# Patient Record
Sex: FEMALE | Race: WHITE | URBAN - METROPOLITAN AREA
[De-identification: names, ages, dates, MRNs, and addresses within clinical notes are randomized per-mention and may not be internally consistent; named-entity substitution may affect disease eponyms.]

---

## 2023-08-15 ENCOUNTER — TELEPHONE (OUTPATIENT)
Dept: PRIMARY CARE | Facility: CLINIC | Age: 48
End: 2023-08-15

## 2024-05-20 DIAGNOSIS — Z30.40 ENCOUNTER FOR SURVEILLANCE OF CONTRACEPTIVES, UNSPECIFIED: ICD-10-CM

## 2024-05-21 RX ORDER — NORETHINDRONE ACETATE AND ETHINYL ESTRADIOL 1MG-20(24)
1 KIT ORAL DAILY
Qty: 84 TABLET | Refills: 3 | Status: SHIPPED | OUTPATIENT
Start: 2024-05-21

## 2024-08-05 ENCOUNTER — APPOINTMENT (OUTPATIENT)
Dept: OBSTETRICS AND GYNECOLOGY | Facility: CLINIC | Age: 49
End: 2024-08-05
Payer: COMMERCIAL

## 2024-08-05 VITALS
WEIGHT: 147.2 LBS | SYSTOLIC BLOOD PRESSURE: 121 MMHG | DIASTOLIC BLOOD PRESSURE: 87 MMHG | BODY MASS INDEX: 25.13 KG/M2 | HEIGHT: 64 IN

## 2024-08-05 DIAGNOSIS — Z30.40 ENCOUNTER FOR SURVEILLANCE OF CONTRACEPTIVES, UNSPECIFIED: ICD-10-CM

## 2024-08-05 DIAGNOSIS — Z01.419 ENCOUNTER FOR GYNECOLOGICAL EXAMINATION WITHOUT ABNORMAL FINDING: Primary | ICD-10-CM

## 2024-08-05 PROCEDURE — 3008F BODY MASS INDEX DOCD: CPT | Performed by: OBSTETRICS & GYNECOLOGY

## 2024-08-05 PROCEDURE — 88175 CYTOPATH C/V AUTO FLUID REDO: CPT

## 2024-08-05 PROCEDURE — 1036F TOBACCO NON-USER: CPT | Performed by: OBSTETRICS & GYNECOLOGY

## 2024-08-05 PROCEDURE — 87624 HPV HI-RISK TYP POOLED RSLT: CPT

## 2024-08-05 PROCEDURE — 99396 PREV VISIT EST AGE 40-64: CPT | Performed by: OBSTETRICS & GYNECOLOGY

## 2024-08-05 PROCEDURE — 88141 CYTOPATH C/V INTERPRET: CPT | Performed by: PATHOLOGY

## 2024-08-05 RX ORDER — DULAGLUTIDE 3 MG/.5ML
INJECTION, SOLUTION SUBCUTANEOUS
COMMUNITY
Start: 2021-11-29

## 2024-08-05 RX ORDER — NORETHINDRONE ACETATE AND ETHINYL ESTRADIOL 1MG-20(24)
1 KIT ORAL DAILY
Qty: 84 TABLET | Refills: 3 | Status: SHIPPED | OUTPATIENT
Start: 2024-08-05

## 2024-08-05 RX ORDER — ATORVASTATIN CALCIUM 40 MG/1
TABLET, FILM COATED ORAL
COMMUNITY
Start: 2021-11-29

## 2024-08-05 RX ORDER — LOSARTAN POTASSIUM 50 MG/1
TABLET ORAL
COMMUNITY

## 2024-08-05 NOTE — PROGRESS NOTES
Subjective   Beckie De León is a 48 y.o. female here for a routine exam. Current complaints: She is on birth control pills and has light menses.  No pelvic pain, no dysuria, no change in bowel habits or vaginal discharge.    She has a history of ulcerative colitis.  She is current on her colonoscopy.    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  Personal health questionnaire reviewed: yes.     Gynecologic History  No LMP recorded (lmp unknown).  Contraception: OCP (estrogen/progesterone)  Last Pap: 21. Results were: normal  Last mammogram: 22. Results were: normal    Obstetric History  OB History    Para Term  AB Living   2 2 0 2 0 2   SAB IAB Ectopic Multiple Live Births   0 0 0 0 2      # Outcome Date GA Lbr [place]/2nd Weight Sex Type Anes PTL Lv   2             1                 Objective   Constitutional: Alert and in no acute distress. Well developed, well nourished.   Head and Face: Head and face: Normal.    Eyes: Normal external exam - nonicteric sclera, extraocular movements intact (EOMI) and no ptosis.   Neck: No neck asymmetry. Supple. Thyroid not enlarged and there were no palpable thyroid nodules.    Pulmonary: No respiratory distress.   Chest: Breasts: Normal appearance, no nipple discharge and no skin changes. Palpation of breasts and axillae: No palpable mass and no axillary lymphadenopathy.   Abdomen: Soft nontender; no abdominal mass palpated. No organomegaly. No hernias.   Genitourinary: External genitalia: Normal. No inguinal lymphadenopathy. Bartholin's Urethral and Skenes Glands: Normal. Urethra: Normal.  Bladder: Normal on palpation. Vagina: Normal. Cervix: Normal.  Uterus: Normal.  Right Adnexa/parametria: Normal.  Left Adnexa/parametria: Normal.  Inspection of Perianal Area: Normal.   Musculoskeletal: No joint swelling seen, normal movements of all extremities.   Skin: Normal skin color and pigmentation, normal skin turgor, and no rash.   Neurologic: Non-focal.  Grossly intact.   Psychiatric: Alert and oriented x 3. Affect normal to patient baseline. Mood: Appropriate.  Physical Exam     Assessment/Plan   Healthy female exam.  This is a 48-year-old female with a normal exam.  A Pap smear was sent.    Her blood pressure is well-controlled and we discussed continuing the OCPs for now.  This was refilled to her pharmacy.    Her routine mammogram was ordered with tomosynthesis.    I will see her routinely in 1 year.  Mammogram ordered.

## 2024-08-14 LAB
CYTOLOGY CMNT CVX/VAG CYTO-IMP: NORMAL
HPV HR 12 DNA GENITAL QL NAA+PROBE: NEGATIVE
HPV HR GENOTYPES PNL CVX NAA+PROBE: NEGATIVE
HPV16 DNA SPEC QL NAA+PROBE: NEGATIVE
HPV18 DNA SPEC QL NAA+PROBE: NEGATIVE
LAB AP CONTRACEPTIVE HISTORY: NORMAL
LAB AP HPV GENOTYPE QUESTION: YES
LAB AP HPV HR: NORMAL
LAB AP PREVIOUS ABNORMAL HISTORY: NORMAL
LABORATORY COMMENT REPORT: NORMAL
PATH REPORT.TOTAL CANCER: NORMAL

## 2024-08-14 NOTE — RESULT ENCOUNTER NOTE
Your Pap smear is considered normal.  Although they see atypical cells, the high risk HPV testing is negative.  Therefore your Pap is considered normal, as you do not have the virus that causes cervical cancer.  The atypical cells can be caused by other common things such as recent intercourse, recent menstrual cycle, cervical irritation.  Follow-up as planned in 1 year.

## 2024-08-30 ENCOUNTER — APPOINTMENT (OUTPATIENT)
Dept: RADIOLOGY | Facility: CLINIC | Age: 49
End: 2024-08-30
Payer: COMMERCIAL

## 2024-09-09 ENCOUNTER — APPOINTMENT (OUTPATIENT)
Dept: RADIOLOGY | Facility: CLINIC | Age: 49
End: 2024-09-09
Payer: COMMERCIAL

## 2024-09-20 ENCOUNTER — HOSPITAL ENCOUNTER (OUTPATIENT)
Dept: RADIOLOGY | Facility: CLINIC | Age: 49
Discharge: HOME | End: 2024-09-20
Payer: COMMERCIAL

## 2024-09-20 VITALS — BODY MASS INDEX: 25.1 KG/M2 | WEIGHT: 147 LBS | HEIGHT: 64 IN

## 2024-09-20 DIAGNOSIS — Z01.419 ENCOUNTER FOR GYNECOLOGICAL EXAMINATION WITHOUT ABNORMAL FINDING: ICD-10-CM

## 2024-09-20 PROCEDURE — 77067 SCR MAMMO BI INCL CAD: CPT

## 2024-10-18 ENCOUNTER — APPOINTMENT (OUTPATIENT)
Dept: RADIOLOGY | Facility: HOSPITAL | Age: 49
End: 2024-10-18
Payer: COMMERCIAL

## 2024-10-18 ENCOUNTER — HOSPITAL ENCOUNTER (EMERGENCY)
Facility: HOSPITAL | Age: 49
Discharge: HOME | End: 2024-10-18
Payer: COMMERCIAL

## 2024-10-18 VITALS
HEIGHT: 64 IN | OXYGEN SATURATION: 100 % | HEART RATE: 98 BPM | RESPIRATION RATE: 16 BRPM | WEIGHT: 145 LBS | SYSTOLIC BLOOD PRESSURE: 121 MMHG | TEMPERATURE: 98 F | DIASTOLIC BLOOD PRESSURE: 96 MMHG | BODY MASS INDEX: 24.75 KG/M2

## 2024-10-18 DIAGNOSIS — S20.229A CONTUSION OF BACK, UNSPECIFIED LATERALITY, INITIAL ENCOUNTER: Primary | ICD-10-CM

## 2024-10-18 PROCEDURE — 99284 EMERGENCY DEPT VISIT MOD MDM: CPT | Mod: 25

## 2024-10-18 PROCEDURE — 72131 CT LUMBAR SPINE W/O DYE: CPT | Mod: FOREIGN READ | Performed by: RADIOLOGY

## 2024-10-18 PROCEDURE — 2500000005 HC RX 250 GENERAL PHARMACY W/O HCPCS: Performed by: PHYSICIAN ASSISTANT

## 2024-10-18 PROCEDURE — 72131 CT LUMBAR SPINE W/O DYE: CPT

## 2024-10-18 PROCEDURE — 2500000001 HC RX 250 WO HCPCS SELF ADMINISTERED DRUGS (ALT 637 FOR MEDICARE OP): Performed by: PHYSICIAN ASSISTANT

## 2024-10-18 RX ORDER — LIDOCAINE 560 MG/1
1 PATCH PERCUTANEOUS; TOPICAL; TRANSDERMAL ONCE
Status: DISCONTINUED | OUTPATIENT
Start: 2024-10-18 | End: 2024-10-18 | Stop reason: HOSPADM

## 2024-10-18 RX ORDER — ACETAMINOPHEN 325 MG/1
975 TABLET ORAL ONCE
Status: COMPLETED | OUTPATIENT
Start: 2024-10-18 | End: 2024-10-18

## 2024-10-18 RX ORDER — CYCLOBENZAPRINE HCL 10 MG
10 TABLET ORAL 3 TIMES DAILY PRN
Qty: 9 TABLET | Refills: 0 | Status: SHIPPED | OUTPATIENT
Start: 2024-10-18 | End: 2024-10-21

## 2024-10-18 ASSESSMENT — COLUMBIA-SUICIDE SEVERITY RATING SCALE - C-SSRS
1. IN THE PAST MONTH, HAVE YOU WISHED YOU WERE DEAD OR WISHED YOU COULD GO TO SLEEP AND NOT WAKE UP?: NO
6. HAVE YOU EVER DONE ANYTHING, STARTED TO DO ANYTHING, OR PREPARED TO DO ANYTHING TO END YOUR LIFE?: NO
2. HAVE YOU ACTUALLY HAD ANY THOUGHTS OF KILLING YOURSELF?: NO

## 2024-10-18 ASSESSMENT — PAIN SCALES - GENERAL
PAINLEVEL_OUTOF10: 6
PAINLEVEL_OUTOF10: 5 - MODERATE PAIN
PAINLEVEL_OUTOF10: 5 - MODERATE PAIN
PAINLEVEL_OUTOF10: 3

## 2024-10-18 ASSESSMENT — PAIN DESCRIPTION - LOCATION: LOCATION: BACK

## 2024-10-18 ASSESSMENT — PAIN - FUNCTIONAL ASSESSMENT
PAIN_FUNCTIONAL_ASSESSMENT: 0-10
PAIN_FUNCTIONAL_ASSESSMENT: 0-10

## 2024-10-18 ASSESSMENT — PAIN DESCRIPTION - PAIN TYPE: TYPE: ACUTE PAIN

## 2024-10-18 ASSESSMENT — PAIN DESCRIPTION - DESCRIPTORS: DESCRIPTORS: ACHING
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
## 2025-02-17 ENCOUNTER — HOSPITAL ENCOUNTER (OUTPATIENT)
Dept: RADIOLOGY | Facility: HOSPITAL | Age: 50
Discharge: HOME | End: 2025-02-17
Payer: COMMERCIAL

## 2025-02-17 ENCOUNTER — OFFICE VISIT (OUTPATIENT)
Dept: ORTHOPEDIC SURGERY | Facility: HOSPITAL | Age: 50
End: 2025-02-17
Payer: COMMERCIAL

## 2025-02-17 VITALS — HEIGHT: 64 IN | WEIGHT: 145 LBS | BODY MASS INDEX: 24.75 KG/M2

## 2025-02-17 DIAGNOSIS — M25.562 ACUTE PAIN OF LEFT KNEE: ICD-10-CM

## 2025-02-17 DIAGNOSIS — S83.242A ACUTE MEDIAL MENISCUS TEAR, LEFT, INITIAL ENCOUNTER: ICD-10-CM

## 2025-02-17 DIAGNOSIS — S83.419A SPRAIN OF MEDIAL COLLATERAL LIGAMENT OF KNEE, INITIAL ENCOUNTER: Primary | ICD-10-CM

## 2025-02-17 PROCEDURE — 73564 X-RAY EXAM KNEE 4 OR MORE: CPT | Mod: LEFT SIDE | Performed by: RADIOLOGY

## 2025-02-17 PROCEDURE — 3008F BODY MASS INDEX DOCD: CPT | Performed by: FAMILY MEDICINE

## 2025-02-17 PROCEDURE — 1036F TOBACCO NON-USER: CPT | Performed by: FAMILY MEDICINE

## 2025-02-17 PROCEDURE — 73721 MRI JNT OF LWR EXTRE W/O DYE: CPT | Mod: LT

## 2025-02-17 PROCEDURE — 99214 OFFICE O/P EST MOD 30 MIN: CPT | Performed by: FAMILY MEDICINE

## 2025-02-17 PROCEDURE — 99204 OFFICE O/P NEW MOD 45 MIN: CPT | Performed by: FAMILY MEDICINE

## 2025-02-17 PROCEDURE — 73564 X-RAY EXAM KNEE 4 OR MORE: CPT | Mod: LT

## 2025-02-17 PROCEDURE — L1812 KO ELASTIC W/JOINTS PRE OTS: HCPCS | Performed by: FAMILY MEDICINE

## 2025-02-17 ASSESSMENT — PAIN - FUNCTIONAL ASSESSMENT: PAIN_FUNCTIONAL_ASSESSMENT: 0-10

## 2025-02-17 ASSESSMENT — PAIN SCALES - GENERAL: PAINLEVEL_OUTOF10: 7

## 2025-02-17 NOTE — PROGRESS NOTES
Orthopedic Injury Clinic Note  Date: 02/17/2025    Assessment & Plan:    Dx: Right MCL sprain with concern for medial meniscus injury    Discussed options for management of this condition and made shared decision making for the selected treatment listed below:  - Today's treatment plan: MRI right knee to assess extent of injury. Hinged knee brace for support.   - Work/exercise limits: No specific restrictions. All activities as tolerated.   - Follow-up: Pending MRI, followup next week.    Pt has exam findings concerning for MCL sprain with laxity and meniscus injury with surgical implications.If present will need surgery to restore function. I certify medical necessity of MRI.    All questions answered and patient is agreeable to plan of care.    Chief complaint: Right knee pain      HPI:  49 old female seen in injury clinic for right knee pain that occurred on 2/15 when she slipped on ice and felt like her knee internally rotated and hyperextended.  Denies any direct trauma to the knee.  Denies any patellar dislocation or history of remote patellar dislocations.  Continues to have decreased range of motion, antalgic gait, and feels unstable with walking.  No prior knee injuries.    There is no problem list on file for this patient.    Past Surgical History:   Procedure Laterality Date    CARPAL TUNNEL RELEASE  09/23/2014    Neuroplasty Decompression Median Nerve At Carpal Tunnel     Current Outpatient Medications on File Prior to Visit   Medication Sig Dispense Refill    atorvastatin (Lipitor) 40 mg tablet       cyclobenzaprine (Flexeril) 10 mg tablet Take 1 tablet (10 mg) by mouth 3 times a day as needed for muscle spasms for up to 3 days. 9 tablet 0    empagliflozin (Jardiance) 25 mg Take 1 tablet (25 mg) by mouth once daily.      losartan (Cozaar) 50 mg tablet       norethindrone-e.estradioL-iron (Blisovi 24 Fe) 1 mg-20 mcg (24)/75 mg (4) tablet Take 1 tablet by mouth once daily. as directed 84 tablet 3     Trulicity 3 mg/0.5 mL pen injector        No current facility-administered medications on file prior to visit.       Exam:  General Exam:  Constitutional - NAD, AAO x 3, conversing appropriately.  HEENT- Normocephalic and atraumatic. No facial deformities. Hearing grossly normal.  Lungs - Breathing non-labored with normal rate. No accessory muscle use.  CV - Extremities warm and well-perfused, brisk capillary refill present.   Neuro - CN II-XII grossly intact.    Left Knee Exam:  No effusion, ecchymosis, erythema, warmth  TTP MCL, distal VMO, medial joint line  NTTP over  lateral joint line, patella, quad tendon, patellar tendon, LCL, popliteal fossa  AROM 0 to 11 5degrees, notably with painful flexion  ACL: [-]Lachman  PCL:  [-]Posterior drawer  MCL: Pain and laxity with valgus testing at 30deg  LCL: No laxity or pain with varus stress at 0 or 30 deg  MENISCAL SIGNS: [+] McMurrays localizing to MJL  PATELLA: [-]Grind, [-]Compression  FAT PAD:  [-]Hoffa´s     Results:  Xrays of left knee obtained on  02/17/2025 reviewed and independently interpreted as:   No fracture or osseous abnormality.  Small suprapatellar effusion.    DME:  Patient was prescribed a functional hinged knee orthosis for right MCL sprain. The patient has weakness, instability and/or deformity of their right knee which requires stabilization from this orthosis to improve their function.       Verbal and written instructions for the use, wear schedule, cleaning and application of this item were given.  Patient was instructed that should the brace result in increased pain, decreased sensation, increased swelling, or an overall worsening of their medical condition, to please contact our office immediately.      Orthotic management and training was provided for skin care, modifications due to healing tissues, edema changes, interruption in skin integrity, and safety precautions with the orthosis.    ** Please excuse any errors in grammar or translation  related to this dictation. Voice recognition software was utilized to prepare this document. **    Richard Morris MD  Sports Medicine Fellow (EM)

## 2025-02-17 NOTE — LETTER
February 17, 2025     Patient: Beckie De León   YOB: 1975   Date of Visit: 2/17/2025       To Whom It May Concern:    It is my medical opinion that Beckie De León  should work from home for the next week as they recover from their knee injury. Beckie will follow up with our office on 2/24/25 .    If you have any questions or concerns, please don't hesitate to call.         Sincerely,        Kris Lala,     CC: No Recipients

## 2025-02-24 ENCOUNTER — OFFICE VISIT (OUTPATIENT)
Dept: ORTHOPEDIC SURGERY | Facility: HOSPITAL | Age: 50
End: 2025-02-24
Payer: COMMERCIAL

## 2025-02-24 ENCOUNTER — HOSPITAL ENCOUNTER (OUTPATIENT)
Dept: RADIOLOGY | Facility: EXTERNAL LOCATION | Age: 50
Discharge: HOME | End: 2025-02-24

## 2025-02-24 DIAGNOSIS — M25.562 ACUTE PAIN OF LEFT KNEE: ICD-10-CM

## 2025-02-24 DIAGNOSIS — S83.419A SPRAIN OF MEDIAL COLLATERAL LIGAMENT OF KNEE, INITIAL ENCOUNTER: ICD-10-CM

## 2025-02-24 DIAGNOSIS — S83.242S TEAR OF MEDIAL MENISCUS OF LEFT KNEE, CURRENT, SEQUELA: Primary | ICD-10-CM

## 2025-02-24 PROCEDURE — 20611 DRAIN/INJ JOINT/BURSA W/US: CPT | Mod: LT | Performed by: FAMILY MEDICINE

## 2025-02-24 PROCEDURE — 2500000004 HC RX 250 GENERAL PHARMACY W/ HCPCS (ALT 636 FOR OP/ED): Performed by: FAMILY MEDICINE

## 2025-02-24 PROCEDURE — 99214 OFFICE O/P EST MOD 30 MIN: CPT | Mod: 25 | Performed by: FAMILY MEDICINE

## 2025-02-24 PROCEDURE — 99214 OFFICE O/P EST MOD 30 MIN: CPT | Performed by: FAMILY MEDICINE

## 2025-02-24 RX ORDER — LIDOCAINE HYDROCHLORIDE 10 MG/ML
4 INJECTION, SOLUTION INFILTRATION; PERINEURAL
Status: COMPLETED | OUTPATIENT
Start: 2025-02-24 | End: 2025-02-24

## 2025-02-24 RX ORDER — TRIAMCINOLONE ACETONIDE 40 MG/ML
40 INJECTION, SUSPENSION INTRA-ARTICULAR; INTRAMUSCULAR
Status: COMPLETED | OUTPATIENT
Start: 2025-02-24 | End: 2025-02-24

## 2025-02-24 RX ADMIN — TRIAMCINOLONE ACETONIDE 40 MG: 200 INJECTION, SUSPENSION INTRA-ARTICULAR; INTRAMUSCULAR at 11:47

## 2025-02-24 RX ADMIN — LIDOCAINE HYDROCHLORIDE 4 ML: 10 INJECTION, SOLUTION INFILTRATION; PERINEURAL at 11:47

## 2025-02-24 NOTE — PROGRESS NOTES
** Please excuse any errors in grammar or translation related to this dictation. Voice recognition software was utilized to prepare this document. **    Assessment & Plan:  Patient following up for acute right knee pain.  MRI corresponds with exam findings of medial meniscal tear and MCL sprain.  We discussed both nonoperative and operative treatment options.  At this time she is most interested in pursuing nonoperative management.  Advise she continue to wear hinged knee brace for support and stability over the next 6 weeks.  Referral placed to physical therapy. Patient was offered completion of intra-articular steroid injection today to mitigate her symptoms which she agreed to have performed.  I did caution the patient on the risk of hyperglycemia given her history of diabetes.  If blood glucose persistently more than 500 or develops acute change mental status or abdominal pain should contact her endocrinologist or go to the ER for evaluation.  We discussed if these above measures fail to provide adequate relief or if develops mechanical symptoms should consider surgical evaluation.  She was provided the contact information for Dr. Turner and Andrea Romero PA-C if considering this route.  I have asked the patient to contact our clinic in 6 weeks to give a status update.  All questions answered and patient agrees this plan of care.      Chief complaint: Right knee pain      HPI:  2/24/25: Patient presents fro right knee MRI results.  She has been wrearing the hinged knee brace which gives her some stability.  She limits NSAID intake due to history of ulcerative colitis.  Flexion of the knee past 90 degrees and use of stairs and flex the most pain.  No locking.  Of note her  had MI last week and is recovering at home.  For this reason she wants to avoid operative intervention at this time.    2/17/25: 49 old female seen in injury clinic for right knee pain that occurred on 2/15 when she slipped on ice and felt  like her knee internally rotated and hyperextended.  Denies any direct trauma to the knee.  Denies any patellar dislocation or history of remote patellar dislocations.  Continues to have decreased range of motion, antalgic gait, and feels unstable with walking.  No prior knee injuries.    There is no problem list on file for this patient.    Past Surgical History:   Procedure Laterality Date    CARPAL TUNNEL RELEASE  09/23/2014    Neuroplasty Decompression Median Nerve At Carpal Tunnel     Current Outpatient Medications on File Prior to Visit   Medication Sig Dispense Refill    atorvastatin (Lipitor) 40 mg tablet       cyclobenzaprine (Flexeril) 10 mg tablet Take 1 tablet (10 mg) by mouth 3 times a day as needed for muscle spasms for up to 3 days. 9 tablet 0    empagliflozin (Jardiance) 25 mg Take 1 tablet (25 mg) by mouth once daily.      losartan (Cozaar) 50 mg tablet       norethindrone-e.estradioL-iron (Blisovi 24 Fe) 1 mg-20 mcg (24)/75 mg (4) tablet Take 1 tablet by mouth once daily. as directed 84 tablet 3    Trulicity 3 mg/0.5 mL pen injector        No current facility-administered medications on file prior to visit.       Exam:  General Exam:  Constitutional - NAD, AAO x 3, conversing appropriately.  HEENT- Normocephalic and atraumatic. No facial deformities. Hearing grossly normal.  Lungs - Breathing non-labored with normal rate. No accessory muscle use.  CV - Extremities warm and well-perfused, brisk capillary refill present.   Neuro - CN II-XII grossly intact.    Left Knee Exam:  No effusion, ecchymosis, erythema, warmth  TTP MCL, distal VMO, medial joint line  NTTP over  lateral joint line, patella, quad tendon, patellar tendon, LCL, popliteal fossa  AROM 0 to 115degrees, notably with painful flexion  ACL: [-]Lachman  PCL:  [-]Posterior drawer  MCL: Pain and laxity with valgus testing at 30deg  LCL: No laxity or pain with varus stress at 0 or 30 deg  MENISCAL SIGNS: [+] McMurrays localizing to  MJL  PATELLA: [-]Grind, [-]Compression  FAT PAD:  [-]Hoffa´s     Results:  Xrays of left knee obtained on  02/24/2025 reviewed and independently interpreted as:   No fracture or osseous abnormality.  Small suprapatellar effusion.    Left knee MRI 2/17/25 reviewed and agree with findings to include MCL sprain, vertical tear of the medial meniscus and articular cartilage loss in the lateral compartment.    Patient ID: Beckie De León is a 49 y.o. female.    L Inj/Asp: L knee on 2/24/2025 11:47 AM  Indications: pain  Details: 25 G needle, ultrasound-guided superolateral approach  Medications: 40 mg triamcinolone acetonide 40 mg/mL; 4 mL lidocaine 10 mg/mL (1 %)  Outcome: tolerated well, no immediate complications    Procedure risk factors to include increased pain, bleeding, infection, neurovascular injury, soft tissue injury, progressive cartilage loss, transient elevation of blood glucose and blood pressure, and adverse reaction to medication were discussed with the patient. Patient understands there is a moderate risk of morbidity from undergoing the procedure.    Procedure, treatment alternatives, risks and benefits explained, specific risks discussed. Consent was given by the patient. Immediately prior to procedure a time out was called to verify the correct patient, procedure, equipment, support staff and site/side marked as required. Patient was prepped and draped in the usual sterile fashion.

## 2025-03-14 ENCOUNTER — OFFICE VISIT (OUTPATIENT)
Dept: ORTHOPEDIC SURGERY | Facility: HOSPITAL | Age: 50
End: 2025-03-14
Payer: COMMERCIAL

## 2025-03-14 DIAGNOSIS — S83.419A SPRAIN OF MEDIAL COLLATERAL LIGAMENT OF KNEE, INITIAL ENCOUNTER: Primary | ICD-10-CM

## 2025-03-14 DIAGNOSIS — S83.242A ACUTE MEDIAL MENISCUS TEAR, LEFT, INITIAL ENCOUNTER: ICD-10-CM

## 2025-03-14 PROCEDURE — 1036F TOBACCO NON-USER: CPT | Performed by: PHYSICIAN ASSISTANT

## 2025-03-14 PROCEDURE — 99214 OFFICE O/P EST MOD 30 MIN: CPT | Performed by: PHYSICIAN ASSISTANT

## 2025-03-14 ASSESSMENT — PAIN - FUNCTIONAL ASSESSMENT: PAIN_FUNCTIONAL_ASSESSMENT: 0-10

## 2025-03-14 ASSESSMENT — PAIN SCALES - GENERAL: PAINLEVEL_OUTOF10: 6

## 2025-03-14 NOTE — PROGRESS NOTES
"Subjective    Patient ID: Beckie De León is a 49 y.o. female.    Chief Complaint: Pain of the Left Knee           HPI:  Beckie De León is a 49 y.o. female who presents today for evaluation of her left knee.  In mid February she slipped on the ice.  She describes a valgus type injury to her left knee.  She recalls feeling a \"pop\" at the time of the injury.  Since then she has had sharp pain over the medial aspect of her knee.  She reports feelings of instability and giving out.  She was evaluated by Dr. Lala on 2/17/2025 and referred for an MRI which has been completed.  She returned back to the office to see him on 2/24/2025 received an intra-articular steroid injection.  She states that the injection really only gave improvement for a few days.  She notes significant stiffness when first getting up to walk.  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  Prior to this injury she had never had any issues or problems with her knee.  She has been using some occasional anti-inflammatory medications but has a history of ulcerative colitis so tries to avoid oral NSAIDs.    ROS  Constitutional: No fever, no chills, not feeling tired, no recent weight gain and no recent weight loss  ENT: No nosebleeds  Cardiovascular: No chest pain  Respiratory: No shortness of breath and no cough  Gastrointestinal: No abdominal pain, no nausea, no diarrhea, and no vomiting  Musculoskeletal: No arthralgias  Integumentary: No rashes and no skin lesions  Neurological: No headache  Psychiatric: No sleep disturbances no depression  Endocrine: No muscle weakness and no muscle cramps  Hematologic/lymphatic: No swelling glands and no tendency for easy bruising    Past Medical History:   Diagnosis Date    Colitis     Type 2 diabetes mellitus         Past Surgical History:   Procedure Laterality Date    CARPAL TUNNEL RELEASE  09/23/2014    Neuroplasty " "Decompression Median Nerve At Carpal Tunnel          Current Outpatient Medications:     atorvastatin (Lipitor) 40 mg tablet, , Disp: , Rfl:     cyclobenzaprine (Flexeril) 10 mg tablet, Take 1 tablet (10 mg) by mouth 3 times a day as needed for muscle spasms for up to 3 days., Disp: 9 tablet, Rfl: 0    empagliflozin (Jardiance) 25 mg, Take 1 tablet (25 mg) by mouth once daily., Disp: , Rfl:     losartan (Cozaar) 50 mg tablet, , Disp: , Rfl:     norethindrone-e.estradioL-iron (Blisovi 24 Fe) 1 mg-20 mcg (24)/75 mg (4) tablet, Take 1 tablet by mouth once daily. as directed, Disp: 84 tablet, Rfl: 3    Trulicity 3 mg/0.5 mL pen injector, , Disp: , Rfl:      Allergies   Allergen Reactions    Glipizide Other     Hypoglycemia    Hydrocodone-Acetaminophen Unknown     Per patient, intolerant to \"any narcotic\"        Social Connections: Not on file          Objective   49-year-old female well appearing in no acute distress. Alert and oriented ×3.  Skin intact bilateral lower extremities.   Normal tandem gait. Coordination and balance intact.  Bilateral lower extremity compartments supple.  5 out of 5 distal motor strength bilaterally.  L4 through S1 sensation intact bilaterally.  2+ DP/PT pulses bilaterally.  No effusion to either knee.  Slightly poor quad contraction on the left when compared to the right.  Active range of motion of the left knee 0 to 140 degrees, symmetric to the right knee.  Grade 1+ laxity with valgus stress at 0 and 30 degrees on the left, no laxity on the right.  Negative anterior drawer and posterior drawer on the left.  Tenderness directly over the midportion of the MCL on the left, minimal tenderness along the medial joint line.    Image Results:  MRI of the left knee dated 2/17/2025 was reviewed today.  There is a mild sprain of the midportion of the MCL as well as a small tear to the posterior horn of the medial meniscus.  No significant degenerative changes are noted.      Assessment/Plan "   Encounter Diagnoses:  Sprain of medial collateral ligament of knee, initial encounter    Acute medial meniscus tear, left, initial encounter    No orders of the defined types were placed in this encounter.      I discussed with her that her MCL may be the biggest component of her complaints.  I would like her to try a course of physical therapy to see the if this will help her overall symptoms.  She is going to continue with her hinged knee brace.  She can apply topical diclofenac to the medial aspect of her knee 3-4 times a day.  Continue frequent icing.  We will see her back in 4 to 6 weeks and if still continue to be symptomatic at that time we will have further discussion regarding arthroscopy and partial medial meniscectomy.    This office note was dictated using Dragon voice to text software and was not proofread for spelling or grammatical errors

## 2025-04-28 ENCOUNTER — OFFICE VISIT (OUTPATIENT)
Dept: ORTHOPEDIC SURGERY | Facility: HOSPITAL | Age: 50
End: 2025-04-28
Payer: COMMERCIAL

## 2025-04-28 DIAGNOSIS — S83.232D COMPLEX TEAR OF MEDIAL MENISCUS OF LEFT KNEE, SUBSEQUENT ENCOUNTER: Primary | ICD-10-CM

## 2025-04-28 PROCEDURE — 99214 OFFICE O/P EST MOD 30 MIN: CPT | Performed by: PHYSICIAN ASSISTANT

## 2025-04-28 PROCEDURE — 1036F TOBACCO NON-USER: CPT | Performed by: PHYSICIAN ASSISTANT

## 2025-04-28 NOTE — H&P (VIEW-ONLY)
Subjective    Patient ID: Beckie De León is a 49 y.o. female.    Chief Complaint: Follow-up of the Left Knee           HPI:  Beckie De León is a 49 y.o. female who returns today for her left knee.  She unfortunately has not seen any improvement with continued conservative management.  She has been doing physical therapy at [facility].  She continues to have pain in the medial aspect of her knee.  She describes it as a pinching sensation that occurs when going into extension.  She does report some occasional feelings of instability.  She has not been able to return back to her usual level of activity.    ROS  Constitutional: No fever, no chills, not feeling tired, no recent weight gain and no recent weight loss  ENT: No nosebleeds  Cardiovascular: No chest pain  Respiratory: No shortness of breath and no cough  Gastrointestinal: No abdominal pain, no nausea, no diarrhea, and no vomiting  Musculoskeletal: No arthralgias  Integumentary: No rashes and no skin lesions  Neurological: No headache  Psychiatric: No sleep disturbances no depression  Endocrine: No muscle weakness and no muscle cramps  Hematologic/lymphatic: No swelling glands and no tendency for easy bruising    Medical History[1]     Surgical History[2]     Current Medications[3]     RX Allergies[4]     Social Connections: Not on file          Objective   49-year-old female well appearing in no acute distress. Alert and oriented ×3.  Skin intact bilateral lower extremities.   Normal tandem gait. Coordination and balance intact.  Bilateral lower extremity compartments supple.  5 out of 5 distal motor strength bilaterally.  L4 through S1 sensation intact bilaterally.  2+ DP/PT pulses bilaterally.  Left knee with no significant effusion.  She has pain with both active and passive extension of the knee.  Tenderness along the medial joint line with a positive Nicole's.  Stable MCL at 0 and 30 degrees.  Negative Lachman's.      Image Results:  Previous MRI of the  left knee did show a tear to the medial meniscus as well as a slight sprain to the MCL.  MRI was reviewed with Dr. Turner.    Assessment/Plan   Encounter Diagnoses:  Complex tear of medial meniscus of left knee, subsequent encounter    No orders of the defined types were placed in this encounter.      She has failed a course of conservative care including intra-articular steroid injection and physical therapy.  Since she remains symptomatic and has complaints and physical exam findings consistent with a meniscus tear we are going to proceed with left knee arthroscopic partial medial meniscectomy.  Risks and benefits of surgery as well as the usual postoperative course were reviewed with her today.  She verbalized understanding of these risks and asked that we proceed.  We did discuss that arthritic changes cannot be changed or reversed at the time of surgery.  We will schedule surgery for her within the next week or 2.    Knee scope risks:    Risks of knee arthroscopy were discussed with the patient at length. These include but are not limited to: Cardiovascular compromise, anesthetic complications, infection, bleeding, neurovascular injury, blood clots, persistent pain, and stiffness.  There is a risk of progression of degenerative changes within the knee.  Some patients have difficulty returning to their preinjury level of activity.  The postoperative course regarding the use of medications, crutches, possible brace, care for the incision, and physical therapy were also outlined. The patient voices understanding of these risks and postoperative course    This office note was dictated using Dragon voice to text software and was not proofread for spelling or grammatical errors       [1]   Past Medical History:  Diagnosis Date    Colitis     Type 2 diabetes mellitus    [2]   Past Surgical History:  Procedure Laterality Date    CARPAL TUNNEL RELEASE  09/23/2014    Neuroplasty Decompression Median Nerve At Carpal Tunnel  "  [3]   Current Outpatient Medications:     atorvastatin (Lipitor) 40 mg tablet, , Disp: , Rfl:     cyclobenzaprine (Flexeril) 10 mg tablet, Take 1 tablet (10 mg) by mouth 3 times a day as needed for muscle spasms for up to 3 days., Disp: 9 tablet, Rfl: 0    empagliflozin (Jardiance) 25 mg, Take 1 tablet (25 mg) by mouth once daily., Disp: , Rfl:     losartan (Cozaar) 50 mg tablet, , Disp: , Rfl:     norethindrone-e.estradioL-iron (Blisovi 24 Fe) 1 mg-20 mcg (24)/75 mg (4) tablet, Take 1 tablet by mouth once daily. as directed, Disp: 84 tablet, Rfl: 3    Trulicity 3 mg/0.5 mL pen injector, , Disp: , Rfl:   [4]   Allergies  Allergen Reactions    Glipizide Other     Hypoglycemia    Hydrocodone-Acetaminophen Unknown     Per patient, intolerant to \"any narcotic\"     "

## 2025-04-28 NOTE — PROGRESS NOTES
Subjective    Patient ID: Beckie De eLón is a 49 y.o. female.    Chief Complaint: Follow-up of the Left Knee           HPI:  Beckie De León is a 49 y.o. female who returns today for her left knee.  She unfortunately has not seen any improvement with continued conservative management.  She has been doing physical therapy at [facility].  She continues to have pain in the medial aspect of her knee.  She describes it as a pinching sensation that occurs when going into extension.  She does report some occasional feelings of instability.  She has not been able to return back to her usual level of activity.    ROS  Constitutional: No fever, no chills, not feeling tired, no recent weight gain and no recent weight loss  ENT: No nosebleeds  Cardiovascular: No chest pain  Respiratory: No shortness of breath and no cough  Gastrointestinal: No abdominal pain, no nausea, no diarrhea, and no vomiting  Musculoskeletal: No arthralgias  Integumentary: No rashes and no skin lesions  Neurological: No headache  Psychiatric: No sleep disturbances no depression  Endocrine: No muscle weakness and no muscle cramps  Hematologic/lymphatic: No swelling glands and no tendency for easy bruising    Medical History[1]     Surgical History[2]     Current Medications[3]     RX Allergies[4]     Social Connections: Not on file          Objective   49-year-old female well appearing in no acute distress. Alert and oriented ×3.  Skin intact bilateral lower extremities.   Normal tandem gait. Coordination and balance intact.  Bilateral lower extremity compartments supple.  5 out of 5 distal motor strength bilaterally.  L4 through S1 sensation intact bilaterally.  2+ DP/PT pulses bilaterally.  Left knee with no significant effusion.  She has pain with both active and passive extension of the knee.  Tenderness along the medial joint line with a positive Nicole's.  Stable MCL at 0 and 30 degrees.  Negative Lachman's.      Image Results:  Previous MRI of the  left knee did show a tear to the medial meniscus as well as a slight sprain to the MCL.  MRI was reviewed with Dr. Turner.    Assessment/Plan   Encounter Diagnoses:  Complex tear of medial meniscus of left knee, subsequent encounter    No orders of the defined types were placed in this encounter.      She has failed a course of conservative care including intra-articular steroid injection and physical therapy.  Since she remains symptomatic and has complaints and physical exam findings consistent with a meniscus tear we are going to proceed with left knee arthroscopic partial medial meniscectomy.  Risks and benefits of surgery as well as the usual postoperative course were reviewed with her today.  She verbalized understanding of these risks and asked that we proceed.  We did discuss that arthritic changes cannot be changed or reversed at the time of surgery.  We will schedule surgery for her within the next week or 2.    Knee scope risks:    Risks of knee arthroscopy were discussed with the patient at length. These include but are not limited to: Cardiovascular compromise, anesthetic complications, infection, bleeding, neurovascular injury, blood clots, persistent pain, and stiffness.  There is a risk of progression of degenerative changes within the knee.  Some patients have difficulty returning to their preinjury level of activity.  The postoperative course regarding the use of medications, crutches, possible brace, care for the incision, and physical therapy were also outlined. The patient voices understanding of these risks and postoperative course    This office note was dictated using Dragon voice to text software and was not proofread for spelling or grammatical errors       [1]   Past Medical History:  Diagnosis Date    Colitis     Type 2 diabetes mellitus    [2]   Past Surgical History:  Procedure Laterality Date    CARPAL TUNNEL RELEASE  09/23/2014    Neuroplasty Decompression Median Nerve At Carpal Tunnel  "  [3]   Current Outpatient Medications:     atorvastatin (Lipitor) 40 mg tablet, , Disp: , Rfl:     cyclobenzaprine (Flexeril) 10 mg tablet, Take 1 tablet (10 mg) by mouth 3 times a day as needed for muscle spasms for up to 3 days., Disp: 9 tablet, Rfl: 0    empagliflozin (Jardiance) 25 mg, Take 1 tablet (25 mg) by mouth once daily., Disp: , Rfl:     losartan (Cozaar) 50 mg tablet, , Disp: , Rfl:     norethindrone-e.estradioL-iron (Blisovi 24 Fe) 1 mg-20 mcg (24)/75 mg (4) tablet, Take 1 tablet by mouth once daily. as directed, Disp: 84 tablet, Rfl: 3    Trulicity 3 mg/0.5 mL pen injector, , Disp: , Rfl:   [4]   Allergies  Allergen Reactions    Glipizide Other     Hypoglycemia    Hydrocodone-Acetaminophen Unknown     Per patient, intolerant to \"any narcotic\"     "

## 2025-04-29 ENCOUNTER — PREP FOR PROCEDURE (OUTPATIENT)
Dept: ORTHOPEDIC SURGERY | Facility: HOSPITAL | Age: 50
End: 2025-04-29
Payer: COMMERCIAL

## 2025-04-29 DIAGNOSIS — M23.92 INTERNAL DERANGEMENT OF LEFT KNEE: ICD-10-CM

## 2025-04-29 DIAGNOSIS — S83.232D COMPLEX TEAR OF MEDIAL MENISCUS OF LEFT KNEE, SUBSEQUENT ENCOUNTER: ICD-10-CM

## 2025-05-01 ENCOUNTER — APPOINTMENT (OUTPATIENT)
Dept: LAB | Facility: HOSPITAL | Age: 50
End: 2025-05-01
Payer: COMMERCIAL

## 2025-05-01 ENCOUNTER — LAB (OUTPATIENT)
Dept: LAB | Facility: HOSPITAL | Age: 50
End: 2025-05-01
Payer: COMMERCIAL

## 2025-05-01 ENCOUNTER — PRE-ADMISSION TESTING (OUTPATIENT)
Dept: PREADMISSION TESTING | Facility: HOSPITAL | Age: 50
End: 2025-05-01
Payer: COMMERCIAL

## 2025-05-01 VITALS
SYSTOLIC BLOOD PRESSURE: 112 MMHG | HEART RATE: 72 BPM | WEIGHT: 140.5 LBS | HEIGHT: 64 IN | TEMPERATURE: 97.7 F | DIASTOLIC BLOOD PRESSURE: 84 MMHG | BODY MASS INDEX: 23.99 KG/M2 | RESPIRATION RATE: 16 BRPM | OXYGEN SATURATION: 100 %

## 2025-05-01 DIAGNOSIS — Z01.818 ENCOUNTER FOR OTHER PREPROCEDURAL EXAMINATION: Primary | ICD-10-CM

## 2025-05-01 DIAGNOSIS — M23.92 INTERNAL DERANGEMENT OF LEFT KNEE: ICD-10-CM

## 2025-05-01 DIAGNOSIS — Z01.818 PREOP TESTING: Primary | ICD-10-CM

## 2025-05-01 DIAGNOSIS — S83.232D COMPLEX TEAR OF MEDIAL MENISCUS OF LEFT KNEE, SUBSEQUENT ENCOUNTER: ICD-10-CM

## 2025-05-01 LAB
ALBUMIN SERPL BCP-MCNC: 4.3 G/DL (ref 3.4–5)
ALP SERPL-CCNC: 55 U/L (ref 33–110)
ALT SERPL W P-5'-P-CCNC: 23 U/L (ref 7–45)
ANION GAP SERPL CALC-SCNC: 14 MMOL/L (ref 10–20)
AST SERPL W P-5'-P-CCNC: 16 U/L (ref 9–39)
BASOPHILS # BLD AUTO: 0.06 X10*3/UL (ref 0–0.1)
BASOPHILS NFR BLD AUTO: 1 %
BILIRUB SERPL-MCNC: 0.5 MG/DL (ref 0–1.2)
BUN SERPL-MCNC: 11 MG/DL (ref 6–23)
CALCIUM SERPL-MCNC: 9.3 MG/DL (ref 8.6–10.3)
CHLORIDE SERPL-SCNC: 104 MMOL/L (ref 98–107)
CO2 SERPL-SCNC: 25 MMOL/L (ref 21–32)
CREAT SERPL-MCNC: 0.8 MG/DL (ref 0.5–1.05)
EGFRCR SERPLBLD CKD-EPI 2021: 90 ML/MIN/1.73M*2
EOSINOPHIL # BLD AUTO: 0.33 X10*3/UL (ref 0–0.7)
EOSINOPHIL NFR BLD AUTO: 5.3 %
ERYTHROCYTE [DISTWIDTH] IN BLOOD BY AUTOMATED COUNT: 12.2 % (ref 11.5–14.5)
EST. AVERAGE GLUCOSE BLD GHB EST-MCNC: 177 MG/DL
GLUCOSE SERPL-MCNC: 249 MG/DL (ref 74–99)
HBA1C MFR BLD: 7.8 % (ref ?–5.7)
HCT VFR BLD AUTO: 43.8 % (ref 36–46)
HGB BLD-MCNC: 14.4 G/DL (ref 12–16)
IMM GRANULOCYTES # BLD AUTO: 0 X10*3/UL (ref 0–0.7)
IMM GRANULOCYTES NFR BLD AUTO: 0 % (ref 0–0.9)
LYMPHOCYTES # BLD AUTO: 2.17 X10*3/UL (ref 1.2–4.8)
LYMPHOCYTES NFR BLD AUTO: 34.8 %
MCH RBC QN AUTO: 30.7 PG (ref 26–34)
MCHC RBC AUTO-ENTMCNC: 32.9 G/DL (ref 32–36)
MCV RBC AUTO: 93 FL (ref 80–100)
MONOCYTES # BLD AUTO: 0.45 X10*3/UL (ref 0.1–1)
MONOCYTES NFR BLD AUTO: 7.2 %
NEUTROPHILS # BLD AUTO: 3.23 X10*3/UL (ref 1.2–7.7)
NEUTROPHILS NFR BLD AUTO: 51.7 %
NRBC BLD-RTO: NORMAL /100{WBCS}
PLATELET # BLD AUTO: 315 X10*3/UL (ref 150–450)
POTASSIUM SERPL-SCNC: 4.8 MMOL/L (ref 3.5–5.3)
PROT SERPL-MCNC: 6.4 G/DL (ref 6.4–8.2)
RBC # BLD AUTO: 4.69 X10*6/UL (ref 4–5.2)
SODIUM SERPL-SCNC: 138 MMOL/L (ref 136–145)
WBC # BLD AUTO: 6.2 X10*3/UL (ref 4.4–11.3)

## 2025-05-01 PROCEDURE — 99203 OFFICE O/P NEW LOW 30 MIN: CPT | Performed by: PHYSICIAN ASSISTANT

## 2025-05-01 PROCEDURE — 85025 COMPLETE CBC W/AUTO DIFF WBC: CPT

## 2025-05-01 PROCEDURE — 36415 COLL VENOUS BLD VENIPUNCTURE: CPT

## 2025-05-01 PROCEDURE — 80053 COMPREHEN METABOLIC PANEL: CPT

## 2025-05-01 ASSESSMENT — ENCOUNTER SYMPTOMS
NECK NEGATIVE: 1
CARDIOVASCULAR NEGATIVE: 1
NEUROLOGICAL NEGATIVE: 1
CONSTITUTIONAL NEGATIVE: 1
ENDOCRINE NEGATIVE: 1
RESPIRATORY NEGATIVE: 1
EYES NEGATIVE: 1

## 2025-05-01 ASSESSMENT — PAIN - FUNCTIONAL ASSESSMENT: PAIN_FUNCTIONAL_ASSESSMENT: 0-10

## 2025-05-01 ASSESSMENT — LIFESTYLE VARIABLES: SMOKING_STATUS: NONSMOKER

## 2025-05-01 ASSESSMENT — PAIN SCALES - GENERAL: PAINLEVEL_OUTOF10: 6

## 2025-05-01 ASSESSMENT — DUKE ACTIVITY SCORE INDEX (DASI)
CAN YOU RUN A SHORT DISTANCE: NO
CAN YOU TAKE CARE OF YOURSELF (EAT, DRESS, BATHE, OR USE TOILET): YES
TOTAL_SCORE: 23.45
CAN YOU DO LIGHT WORK AROUND THE HOUSE LIKE DUSTING OR WASHING DISHES: YES
CAN YOU DO HEAVY WORK AROUND THE HOUSE LIKE SCRUBBING FLOORS OR LIFTING AND MOVING HEAVY FURNITURE: NO
DASI METS SCORE: 5.6
CAN YOU PARTICIPATE IN MODERATE RECREATIONAL ACTIVITIES LIKE GOLF, BOWLING, DANCING, DOUBLES TENNIS OR THROWING A BASEBALL OR FOOTBALL: NO
CAN YOU CLIMB A FLIGHT OF STAIRS OR WALK UP A HILL: YES
CAN YOU WALK A BLOCK OR TWO ON LEVEL GROUND: YES
CAN YOU WALK INDOORS, SUCH AS AROUND YOUR HOUSE: YES
CAN YOU DO MODERATE WORK AROUND THE HOUSE LIKE VACUUMING, SWEEPING FLOORS OR CARRYING GROCERIES: YES
CAN YOU PARTICIPATE IN STRENOUS SPORTS LIKE SWIMMING, SINGLES TENNIS, FOOTBALL, BASKETBALL, OR SKIING: NO
CAN YOU DO YARD WORK LIKE RAKING LEAVES, WEEDING OR PUSHING A MOWER: YES
CAN YOU HAVE SEXUAL RELATIONS: NO

## 2025-05-01 NOTE — PREPROCEDURE INSTRUCTIONS
Medication List            Accurate as of May 1, 2025  8:14 AM. Always use your most recent med list.                atorvastatin 40 mg tablet  Commonly known as: Lipitor  Medication Adjustments for Surgery: Take/Use as prescribed     Blisovi 24 Fe 1 mg-20 mcg (24)/75 mg (4) tablet  Generic drug: norethindrone-e.estradioL-iron  Take 1 tablet by mouth once daily. as directed  Medication Adjustments for Surgery: Take/Use as prescribed     cyclobenzaprine 10 mg tablet  Commonly known as: Flexeril  Take 1 tablet (10 mg) by mouth 3 times a day as needed for muscle spasms for up to 3 days.  Medication Adjustments for Surgery: Take/Use as prescribed     empagliflozin 25 mg tablet  Commonly known as: Jardiance  Medication Adjustments for Surgery: Take last dose 3 days before surgery  Notes to patient: Last dose 5/2/25     losartan 50 mg tablet  Commonly known as: Cozaar  Medication Adjustments for Surgery: Take last dose 1 day (24 hours) before surgery  Notes to patient: Last dose 5/5/25 MORNING     Trulicity 3 mg/0.5 mL injection  Generic drug: dulaglutide  Medication Adjustments for Surgery: Do Not take on the morning of surgery                      Thank you for visiting [facility].  If you have any changes to your health condition, please call the surgeons office to alert them and give them details of your symptoms.        NPO Instructions:  Because you are on a GLP-1 medication per  preoperative protocol you are to hold solid food and non-clear liquids for 24 hours preoperatively. Clear liquids must be held 2 hours before your procedure. If you are diabetic please follow preoperative diabetic glucose management instructions below. If you have any further questions please contact our pre-admission testing department at [facility] [place], [place] at [phone].    Patients on GLP1 agonists (because of the delayed gastric emptying effect) should do an “extended fast”. Specifically, this means NPO for solids and  nonclear liquids for 24 hrs. prior to surgery. High-caloric clear liquids (such as used in ERAS protocols) are stopped at 8 hrs. prior to procedure. Clear liquids held 2 hrs. prior to procedure.  Examples of clear liquids include black tea/coffee with no cream/sugar, water, apple juice, and electrolyte drinks (please avoid red drinks)       CONTACT SURGEON'S OFFICE IF YOU DEVELOP:  * Fever = 100.4 F   * New respiratory symptoms (e.g. cough, shortness of breath, respiratory distress, sore throat)  * Recent loss of taste or smell  *Flu like symptoms such as headache, fatigue or gastrointestinal symptoms  * You develop any open sores, shingles, burning or painful urination   AND/OR:  * You no longer wish to have the surgery.  * Any other personal circumstances change that may lead to the need to cancel or defer this surgery.  *You were admitted to any hospital within one week of your planned procedure.     SMOKING:  *Quitting smoking can make a huge difference to your health and recovery from surgery.    *If you need help with quitting, call [phone]-[phone]-NOW.     SURGICAL TIME:  *You will be contacted between 2 p.m. and 3 p.m. the business day before your surgery with your arrival time.  *If you haven't received a call by 3pm, call [phone]  *Scheduled surgery times may change and you will be notified if this occurs-check your personal voicemail for any updates.     ON THE MORNING OF SURGERY:  *Wear comfortable, loose fitting clothing.   *Do not use moisturizers, creams, lotions or perfume.  *All jewelry and valuables should be left at home.  *Prosthetic devices such as contact lenses, hearing aids, dentures, eyelash extensions, hairpins and body piercing must be removed before surgery.     BRING WITH YOU:  *Photo ID and insurance card  *Current list of medications and allergies  *Pacemaker/Defibrillator/Heart stent cards  *CPAP machine and mask  *Slings/splints/crutches  *Copy of your complete Advanced  Directive/DHPOA-if applicable  *Neurostimulator implant remote     PARKING AND ARRIVAL:  *Check in at the Main Entrance desk and let them know you are here for surgery.     IF YOU ARE HAVING OUTPATIENT/SAME DAY SURGERY:  *A responsible adult MUST accompany you at the time of discharge and stay with you for 24 hours after your surgery.  *You may NOT drive yourself home after surgery.  *You may use a taxi or ride sharing service ([employer], Uber) to return home ONLY if you are accompanied by a friend or family member.  *Instructions for resuming your medications will be provided by your surgeon.        Jenni Romo PA-C  P: [phone]  Department of Anesthesiology and Perioperative Medicine  --            Preoperative Brain Exercises    What are brain exercises?  A brain exercise is any activity that engages your thinking (cognitive) skills.    What types of activities are considered brain exercises?  Jigsaw puzzles, crossword puzzles, word jumble, memory games, word search, and many more.  Many can be found free online or on your phone via a mobile didi.    Why should I do brain exercises before my surgery?  More recent research has shown brain exercise before surgery can lower the risk of postoperative delirium (confusion) which can be especially important for older adults.  Patients who did brain exercises for 5 to 10 hours the days before surgery, cut their risk of postoperative delirium in half up to 1 week after surgery.                  [facility]    Preoperative Deep Breathing Exercises    Why it is important to do deep breathing exercises before my surgery?  Deep breathing exercises strengthen your breathing muscles.  This helps you to recover after your surgery and decreases the chance of breathing complications.      How are the deep breathing exercises done?  Sit straight with your back supported.  Breathe in deeply and slowly through your nose. Your lower rib cage should expand  and your abdomen may move forward.  Hold that breath for 3 to 5 seconds.  Breathe out through pursed lips, slowly and completely.  Rest and repeat 10 times every hour while awake.  Rest longer if you become dizzy or lightheaded.      Patient Information: Incentive Spirometer  What is an incentive spirometer?  An incentive spirometer is a device used before and after surgery to “exercise” your lungs.  It helps you to take deeper breaths to expand your lungs.  Below is an example of a basic incentive spirometer.  The device you receive may differ slightly but they all function the same.    Why do I need to use an incentive spirometer?  Using your incentive spirometer prepares your lungs for surgery and helps prevent lung problems after surgery.  How do I use my incentive spirometer?  When you're using your incentive spirometer, make sure to breathe through your mouth. If you breathe through your nose, the incentive spirometer won't work properly. You can hold your nose if you have trouble.  If you feel dizzy at any time, stop and rest. Try again at a later time.  Follow the steps below:  Set up your incentive spirometer, expand the flexible tubing and connect to the outlet.  Sit upright in a chair or bed. Hold the incentive spirometer at eye level.   Put the mouthpiece in your mouth and close your lips tightly around it. Slowly breathe out (exhale) completely.  Breathe in (inhale) slowly through your mouth as deeply as you can. As you take a breath, you will see the piston rise inside the large column. While the piston rises, the indicator should move upwards. It should stay in between the 2 arrows (see Figure).  Try to get the piston as high as you can, while keeping the indicator between the arrows.   If the indicator doesn't stay between the arrows, you're breathing either too fast or too slow.  When you get it as high as you can, hold your breath for 10 seconds, or as long as possible. While you're holding your  breath, the piston will slowly fall to the base of the spirometer.  Once the piston reaches the bottom of the spirometer, breathe out slowly through your mouth. Rest for a few seconds.  Repeat 10 times. Try to get the piston to the same level with each breath.  Repeat every hour while awake  You can carefully clean the outside of the mouthpiece with an alcohol wipe or soap and water.            Patient and Family Education             Ways You Can Help Prevent Blood Clots             This handout explains some simple things you can do to help prevent blood clots.      Blood clots are blockages that can form in the body's veins. When a blood clot forms in your deep veins, it may be called a deep vein thrombosis, or DVT for short. Blood clots can happen in any part of the body where blood flows, but they are most common in the arms and legs. If a piece of a blood clot breaks free and travels to the lungs, it is called a pulmonary embolus (PE). A PE can be a very serious problem.         Being in the hospital or having surgery can raise your chances of getting a blood clot because you may not be well enough to move around as much as you normally do.         Ways you can help prevent blood clots in the hospital         Wearing SCDs. SCDs stands for Sequential Compression Devices.   SCDs are special sleeves that wrap around your legs  They attach to a pump that fills them with air to gently squeeze your legs every few minutes.   This helps return the blood in your legs to your heart.   SCDs should only be taken off when walking or bathing.   SCDs may not be comfortable, but they can help save your life.               Wearing compression stockings - if your doctor orders them. These special snug fitting stockings gently squeeze your legs to help blood flow.       Walking. Walking helps move the blood in your legs.   If your doctor says it is ok, try walking the halls at least   5 times a day. Ask us to help you get up, so  you don't fall.      Taking any blood thinning medicines your doctor orders.             [facility]; 3/23       Ways you can help prevent blood clots at home       Wearing compression stockings - if your doctor orders them. ? Walking - to help move the blood in your legs.       Taking any blood thinning medicines your doctor orders.      Signs of a blood clot or PE      Tell your doctor or nurse know right away if you have of the problems listed below.    If you are at home, seek medical care right away. Call 911 for chest pain or problems breathing.               Signs of a blood clot (DVT) - such as pain,  swelling, redness or warmth in your arm or leg      Signs of a pulmonary embolism (PE) - such as chest     pain or feeling short of breath           The Week before Surgery        Seven days before Surgery  Check your [facility] medication instructions  Do the exercises provided to you by [facility]   Arrange for a responsible, adult licensed  to take you home after surgery and stay with you for 24 hours.  You will not be permitted to drive yourself home if you have received any anesthetic/sedation  Six days before surgery  Check your [facility] medication instructions  Do the exercises provided to you by [facility]   Start using Chlorhexidene (CHG) body wash if prescribed  Five days before surgery  Check your [facility] medication instructions  Do the exercises provided to you by [facility]   Continue to use CHG body wash if prescribed  Three days before surgery  Check your [facility] medication instructions  Do the exercises provided to you by [facility]   Continue to use CHG body wash if prescribed  Two days before surgery  Check your CPM medication instructions  Do the exercises provided to you by [facility]   Continue to use CHG body wash if prescribed    The Day before Surgery       Check your CPM medication and all other CPM instructions including when to stop eating and drinking  You will be called with your arrival time for surgery in the late  afternoon.  If you do not receive a call please reach out to your surgeon's office.  Do not smoke or drink 24 hours before surgery  Prepare items to bring with you to the hospital  Shower with your chlorhexidine wash if prescribed  Brush your teeth and use your chlorhexidine dental rinse if prescribed    The Day of Surgery       Check your [facility] medication instructions  Ensure you follow the instructions for when to stop eating and drinking  Shower, if prescribed use CHG.  Do not apply any lotions, creams, moisturizers, perfume or deodorant  Brush your teeth and use your CHG dental rinse if prescribed  Wear loose comfortable clothing  Avoid make-up  Remove  jewelry and piercings, consider professional piercing removal with a plastic spacer if needed  Bring photo ID and Insurance card  Bring an accurate medication list that includes medication dose, frequency and allergies  Bring a copy of your advanced directives (will, health care power of )  Bring any devices and controllers as well as medical devices you have been provided with for surgery (CPAP, slings, braces, etc.)  Dentures, eyeglasses, and contacts will be removed before surgery, please bring cases for contacts or glasses

## 2025-05-01 NOTE — CPM/PAT H&P
CPM/PAT Evaluation       Name: Beckie De León (Beckie De León)  /Age: 1975/49 y.o.     Visit Type:   In-Person       Chief Complaint: left knee pain    HPI : Patient is a 50 yo F scheduled for left knee partial medial meniscectomy on 25 with Dr. Turner secondary to complex tear of medial meniscus of left knee, and internal derangement of left knee.  Patient was referred by Dr. Turner  to CPM today for perioperative risk stratification and optimization. Patient's PMHx is notable for HTN, HLD, T2DM, ulcerative colitis.      Medical History[1]    Surgical History[2]    Patient  reports being sexually active.    Family History[3]    Allergies[4]    Prior to Admission medications    Medication Sig Start Date End Date Taking? Authorizing Provider   atorvastatin (Lipitor) 40 mg tablet  21  Yes Historical Provider, MD   empagliflozin (Jardiance) 25 mg Take 1 tablet (25 mg) by mouth once daily. 24  Yes Historical Provider, MD   losartan (Cozaar) 50 mg tablet    Yes Historical Provider, MD   norethindrone-e.estradioL-iron (Blisovi 24 Fe) 1 mg-20 mcg (24)/75 mg (4) tablet Take 1 tablet by mouth once daily. as directed 24  Yes Melina Russell MD   cyclobenzaprine (Flexeril) 10 mg tablet Take 1 tablet (10 mg) by mouth 3 times a day as needed for muscle spasms for up to 3 days.  Patient not taking: Reported on 2025 10/18/24 10/21/24  Kallie Juarez PA-C   Trulicity 3 mg/0.5 mL pen injector  21   Historical Provider, MD PETERSON ROS:   Constitutional:   neg    Neuro/Psych:   neg    Eyes:   neg    Ears:   neg    Nose:   neg    Mouth:   neg    Throat:   neg    Neck:   neg    Cardio:   neg    Respiratory:   neg    Endocrine:   neg    GI:    Chronic constipation/diarrhea  :   neg    Musculoskeletal:    Left knee pain  Hematologic:   neg    Skin:  neg        Physical Exam  Vitals and nursing note reviewed.   Constitutional:       General: She is not in acute distress.     Appearance: She is  "not ill-appearing or toxic-appearing.   HENT:      Head: Normocephalic and atraumatic.      Nose: Nose normal.      Mouth/Throat:      Mouth: Mucous membranes are moist.   Eyes:      Conjunctiva/sclera: Conjunctivae normal.   Neck:      Vascular: No carotid bruit.   Cardiovascular:      Rate and Rhythm: Normal rate and regular rhythm.      Heart sounds: Normal heart sounds. No murmur heard.  Pulmonary:      Effort: Pulmonary effort is normal.      Breath sounds: Normal breath sounds.   Abdominal:      General: There is no distension.      Palpations: Abdomen is soft.      Tenderness: There is no abdominal tenderness.   Musculoskeletal:         General: Normal range of motion.      Cervical back: Normal range of motion.   Skin:     General: Skin is warm and dry.      Capillary Refill: Capillary refill takes less than 2 seconds.   Neurological:      General: No focal deficit present.      Mental Status: She is alert.   Psychiatric:         Mood and Affect: Mood normal.         Behavior: Behavior normal.          PAT AIRWAY:   Airway:     Mallampati::  II    TM distance::  >3 FB    Neck ROM::  Full          Visit Vitals  /84   Pulse 72   Temp 36.5 °C (97.7 °F)   Resp 16   Ht 1.626 m (5' 4\")   Wt 63.7 kg (140 lb 8 oz)   SpO2 100%   BMI 24.12 kg/m²   OB Status Having periods   Smoking Status Never   BSA 1.7 m²       DASI Risk Score      Flowsheet Row Pre-Admission Testing from 5/1/2025 in [facility] Questionnaire Series Submission from 4/30/2025 in [facility] OR with Generic Provider Melonie   Can you take care of yourself (eat, dress, bathe, or use toilet)?  2.75 filed at 05/01/2025 0829 2.75  filed at 04/30/2025 1732   Can you walk indoors, such as around your house? 1.75 filed at 05/01/2025 0829 1.75  filed at 04/30/2025 1732   Can you walk a block or two on level ground?  2.75 filed at 05/01/2025 0829 2.75  filed at 04/30/2025 1732   Can you climb a flight of stairs or walk up " a hill? 5.5 filed at 05/01/2025 0829 5.5  filed at 04/30/2025 1732   Can you run a short distance? 0 filed at 05/01/2025 0829 0  filed at 04/30/2025 1732   Can you do light work around the house like dusting or washing dishes? 2.7 filed at 05/01/2025 0829 2.7  filed at 04/30/2025 1732   Can you do moderate work around the house like vacuuming, sweeping floors or carrying groceries? 3.5 filed at 05/01/2025 0829 3.5  filed at 04/30/2025 1732   Can you do heavy work around the house like scrubbing floors or lifting and moving heavy furniture?  0 filed at 05/01/2025 0829 0  filed at 04/30/2025 1732   Can you do yard work like raking leaves, weeding or pushing a mower? 4.5 filed at 05/01/2025 0829 4.5  filed at 04/30/2025 1732   Can you have sexual relations? 0 filed at 05/01/2025 0829 0  filed at 04/30/2025 1732   Can you participate in moderate recreational activities like golf, bowling, dancing, doubles tennis or throwing a baseball or football? 0 filed at 05/01/2025 0829 0  filed at 04/30/2025 1732   Can you participate in strenous sports like swimming, singles tennis, football, basketball, or skiing? 0 filed at 05/01/2025 0829 0  filed at 04/30/2025 1732   DASI SCORE 23.45 filed at 05/01/2025 0829 23.45  filed at 04/30/2025 1732   METS Score (Will be calculated only when all the questions are answered) 5.6 filed at 05/01/2025 0829 5.6  filed at 04/30/2025 1732          Caprini DVT Assessment      Flowsheet Row Pre-Admission Testing from 5/1/2025 in [facility]   DVT Score (IF A SCORE IS NOT CALCULATING, MUST SELECT A BMI TO COMPLETE) 5 filed at 05/01/2025 0828   Surgical Factors Major surgery planned, including arthroscopic and laproscopic (1-2 hours) filed at 05/01/2025 0828   Women Oral contraceptives filed at 05/01/2025 0828   BMI (BMI MUST BE CHOSEN) 30 or less filed at 05/01/2025 0828          Modified Frailty Index    No data to display       [id number] Stroke Risk Points  Current as of just  now        N/A 0 to 9 Points:      Last Change: N/A          The [id number] risk score (Luis PLUNKETT, et al. 2009. © 2010 [employer]) quantifies the risk of stroke for a patient with atrial fibrillation. For patients without atrial fibrillation or under the age of 18 this score appears as N/A. Higher score values generally indicate higher risk of stroke.        This score is not applicable to this patient. Components are not calculated.          Revised Cardiac Risk Index      Flowsheet Row Pre-Admission Testing from 5/1/2025 in [facility]   High-Risk Surgery (Intraperitoneal, Intrathoracic,Suprainguinal vascular) 0 filed at 05/01/2025 0829   History of ischemic heart disease (History of MI, History of positive exercuse test, Current chest paint considered due to myocardial ischemia, Use of nitrate therapy, ECG with pathological Q Waves) 0 filed at 05/01/2025 0829   History of congestive heart failure (pulmonary edemia, bilateral rales or S3 gallop, Paroxysmal nocturnal dyspnea, CXR showing pulmonary vascular redistribution) 0 filed at 05/01/2025 0829   History of cerebrovascular disease (Prior TIA or stroke) 0 filed at 05/01/2025 0829   Pre-operative insulin treatment 0 filed at 05/01/2025 0829   Pre-operative creatinine>2 mg/dl 0 filed at 05/01/2025 0829   Revised Cardiac Risk Calculator 0 filed at 05/01/2025 0829          Apfel Simplified Score      Flowsheet Row Pre-Admission Testing from 5/1/2025 in [facility]   Smoking status 1 filed at 05/01/2025 0829   History of motion sickness or PONV  1 filed at 05/01/2025 0829   Use of postoperative opioids 0 filed at 05/01/2025 0829   Gender - Female 1=Yes filed at 05/01/2025 0829   Apfel Simplified Score Calculator 3 filed at 05/01/2025 0829          Risk Analysis Index Results This Encounter    No data found in the last 10 encounters.       Stop Bang Score      Flowsheet Row Pre-Admission Testing from 5/1/2025 in  [facility] Questionnaire Series Submission from 4/30/2025 in [facility] OR with Generic Provider Melonie   Do you snore loudly? 0 filed at 05/01/2025 0806 0  filed at 04/30/2025 1732   Do you often feel tired or fatigued after your sleep? 0 filed at 05/01/2025 0806 0  filed at 04/30/2025 1732   Has anyone ever observed you stop breathing in your sleep? 0 filed at 05/01/2025 0806 0  filed at 04/30/2025 1732   Do you have or are you being treated for high blood pressure? 1 filed at 05/01/2025 0806 1  filed at 04/30/2025 1732   Recent BMI (Calculated) 24.1 filed at 05/01/2025 0806 24.9  filed at 04/30/2025 1732   Is BMI greater than 35 kg/m2? 0=No filed at 05/01/2025 0806 0=No  filed at 04/30/2025 1732   Age older than 50 years old? 0=No filed at 05/01/2025 0806 0=No  filed at 04/30/2025 1732   Is your neck circumference greater than 17 inches (Male) or 16 inches (Female)? 0 filed at 05/01/2025 0806 --   Gender - Male 0=No filed at 05/01/2025 0806 0=No  filed at 04/30/2025 1732   STOP-BANG Total Score 1 filed at 05/01/2025 0806 --          Prodigy: High Risk  Total Score: 0          ARISCAT Score for Postoperative Pulmonary Complications      Flowsheet Row Pre-Admission Testing from 5/1/2025 in [facility]   Age Calculated Score 3 filed at 05/01/2025 0829   Preoperative SpO2 0 filed at 05/01/2025 0829   Respiratory infection in the last month Either upper or lower (i.e., URI, bronchitis, pneumonia), with fever and antibiotic treatment 0 filed at 05/01/2025 0829   Preoperative anemia (Hgb less than 10 g/dl) 0 filed at 05/01/2025 0829   Surgical incision  0 filed at 05/01/2025 0829   Duration of surgery  0 filed at 05/01/2025 0829   Emergency Procedure  0 filed at 05/01/2025 0829   ARISCAT Total Score  3 filed at 05/01/2025 0829          Brandon Perioperative Risk for Myocardial Infarction or Cardiac Arrest (JAMEE)      Flowsheet Row Pre-Admission Testing from 5/1/2025 in   [facility]   Calculated Age Score 1 filed at 05/01/2025 0829   Functional Status  0 filed at 05/01/2025 0829   ASA Class  -3.29 filed at 05/01/2025 0829   Creatinine 0 filed at 05/01/2025 0829   Type of Procedure  0.80 filed at 05/01/2025 0829   JAMEE Total Score  -6.74 filed at 05/01/2025 0829   JAMEE % 0.12 filed at 05/01/2025 0829            Assessment & Plan    Neuro:  No diagnosis or significant findings on chart review or clinical presentation and evaluation.    The patient is at an increased risk for perioperative stroke secondary to HTN, HLD, DM , and female sex .    HEENT/Airway:   No diagnosis or significant findings on chart review or clinical presentation and evaluation.   STOP-BANG Score- 1 points low risk for ELKE    Mallampati::  II    TM distance::  >3 FB    Neck ROM::  Full  Dentures-denies  Crowns-reports  Implants-denies    Cardiovascular:    -HTN - on losartan  -HLD - on atorvastatin  No additional preoperative testing is currently indicated.  METS: 5.6  RCRI: 0 points, 3.9%    30 day risk of MACE (risk for cardiac death, nonfatal myocardial infarction, and nonfactal cardiac arrest  JAMEE:  0.12  % risk of intraoperative or 30-day postoperative MACE    Pulmonary:     No diagnosis or significant findings on chart review or clinical presentation and evaluation.   ARISCAT: <26 points, 1.6% risk of in-hospital postoperative pulmonary complication  PRODIGY: Low risk for opioid induced respiratory depression  Preoperative deep breathing educational handout provided to patient.    Renal:  No diagnosis or significant findings on chart review or clinical presentation and evaluation, however, the patient is at increased risk of perioperative renal complications secondary to age>/= 56, HTN, and diabetes. Preventative measures include BP monitoring, medication compliance, and hydration management.   CMP-Pending    Endocrine:  - T2DM - on jardiance and trulicty, will check A1c today    Hematologic:     No diagnosis or significant findings on chart review or clinical presentation and evaluation.  The patient is not a Jehovah’s witness and will accept blood and blood products if medically indicated.   History of previous blood transfusions No  CBC-Pending    Caprini Score 5, patient at Moderate for postoperative DVT. Pt supplied education/VTE handout  Anticoagulation use: No   Preoperative DVT educational handout provided to patient.    Gastrointestinal:     - Ulcerative colitis - in remission  Drug use : marijuana for UC  Alcohol use none    Apfel: 3 points 61% risk for post operative N/V    Infectious disease:    No diagnosis or significant findings on chart review or clinical presentation and evaluation.       Musculoskeletal:  No diagnosis or significant findings on chart review or clinical presentation and evaluation.      Anesthesia:  ASA 2 - Patient with mild systemic disease with no functional limitations    History of General anesthesia- yes  Complications- PONV  No family history of anesthesia complications    Nickel/metal allergy-negative  Shellfish allergy-negative    Discussed with patient medication instructions, NPO guidelines, and any questions or concerns. Patient does not need further workup prior to preceding with elective surgery based on based on risk assessment.       Jenni Romo PA-C 5/1/2025 8:43 AM      Pending labs ordered:  cbc, comp, and A1c  Follow up needed: labs             [1]   Past Medical History:  Diagnosis Date    Colitis     Hyperlipidemia     Hypertension     PONV (postoperative nausea and vomiting)     Type 2 diabetes mellitus     Ulcerative colitis     in remission as of 5/1/25   [2]   Past Surgical History:  Procedure Laterality Date    CARPAL TUNNEL RELEASE  09/23/2014    Neuroplasty Decompression Median Nerve At Carpal Tunnel   [3]   Family History  Problem Relation Name Age of Onset    Scleroderma Mother      Diabetes type II Father     [4]   Allergies  Allergen  "Reactions    Glipizide Other     Hypoglycemia    Hydrocodone-Acetaminophen Unknown     Per patient, intolerant to \"any narcotic\"     "

## 2025-05-02 ENCOUNTER — DOCUMENTATION (OUTPATIENT)
Dept: PREADMISSION TESTING | Facility: HOSPITAL | Age: 50
End: 2025-05-02
Payer: COMMERCIAL

## 2025-05-02 NOTE — NURSING NOTE
PAT labs reviewed with no significant abnormalities identified.  HBA1C was noted to be 7.8%, baseline. Dr Turner was notified

## 2025-05-06 ENCOUNTER — ANESTHESIA EVENT (OUTPATIENT)
Dept: OPERATING ROOM | Facility: HOSPITAL | Age: 50
End: 2025-05-06
Payer: COMMERCIAL

## 2025-05-06 ENCOUNTER — ANESTHESIA (OUTPATIENT)
Dept: OPERATING ROOM | Facility: HOSPITAL | Age: 50
End: 2025-05-06
Payer: COMMERCIAL

## 2025-05-06 ENCOUNTER — HOSPITAL ENCOUNTER (OUTPATIENT)
Facility: HOSPITAL | Age: 50
Setting detail: OUTPATIENT SURGERY
Discharge: HOME | End: 2025-05-06
Attending: ORTHOPAEDIC SURGERY | Admitting: ORTHOPAEDIC SURGERY
Payer: COMMERCIAL

## 2025-05-06 VITALS
SYSTOLIC BLOOD PRESSURE: 117 MMHG | HEIGHT: 64 IN | BODY MASS INDEX: 24.09 KG/M2 | DIASTOLIC BLOOD PRESSURE: 81 MMHG | HEART RATE: 86 BPM | WEIGHT: 141.09 LBS | OXYGEN SATURATION: 97 % | TEMPERATURE: 97.9 F | RESPIRATION RATE: 16 BRPM

## 2025-05-06 DIAGNOSIS — S83.242A ACUTE MEDIAL MENISCUS TEAR, LEFT, INITIAL ENCOUNTER: Primary | ICD-10-CM

## 2025-05-06 PROBLEM — R11.2 PONV (POSTOPERATIVE NAUSEA AND VOMITING): Status: ACTIVE | Noted: 2025-05-06

## 2025-05-06 PROBLEM — K51.90 ULCERATIVE COLITIS: Status: ACTIVE | Noted: 2025-05-06

## 2025-05-06 PROBLEM — E78.5 HYPERLIPIDEMIA: Status: ACTIVE | Noted: 2025-05-06

## 2025-05-06 PROBLEM — Z98.890 PONV (POSTOPERATIVE NAUSEA AND VOMITING): Status: ACTIVE | Noted: 2025-05-06

## 2025-05-06 PROBLEM — I10 HTN (HYPERTENSION): Status: ACTIVE | Noted: 2025-05-06

## 2025-05-06 PROBLEM — E11.9 DIABETES MELLITUS, TYPE 2 (MULTI): Status: ACTIVE | Noted: 2025-05-06

## 2025-05-06 LAB
GLUCOSE BLD MANUAL STRIP-MCNC: 94 MG/DL (ref 74–99)
HCG UR QL IA.RAPID: NEGATIVE

## 2025-05-06 PROCEDURE — 2500000004 HC RX 250 GENERAL PHARMACY W/ HCPCS (ALT 636 FOR OP/ED): Mod: JZ | Performed by: ANESTHESIOLOGY

## 2025-05-06 PROCEDURE — 81025 URINE PREGNANCY TEST: CPT | Performed by: ORTHOPAEDIC SURGERY

## 2025-05-06 PROCEDURE — 2720000007 HC OR 272 NO HCPCS: Performed by: ORTHOPAEDIC SURGERY

## 2025-05-06 PROCEDURE — A29877 PR KNEE SCOPE,SHAVE ARTICULAR CART: Performed by: ANESTHESIOLOGIST ASSISTANT

## 2025-05-06 PROCEDURE — 7100000001 HC RECOVERY ROOM TIME - INITIAL BASE CHARGE: Performed by: ORTHOPAEDIC SURGERY

## 2025-05-06 PROCEDURE — 7100000009 HC PHASE TWO TIME - INITIAL BASE CHARGE: Performed by: ORTHOPAEDIC SURGERY

## 2025-05-06 PROCEDURE — 29877 ARTHRS KNEE SURG DBRDMT/SHVG: CPT | Performed by: ORTHOPAEDIC SURGERY

## 2025-05-06 PROCEDURE — 82947 ASSAY GLUCOSE BLOOD QUANT: CPT

## 2025-05-06 PROCEDURE — A29877 PR KNEE SCOPE,SHAVE ARTICULAR CART: Performed by: ANESTHESIOLOGY

## 2025-05-06 PROCEDURE — 3600000004 HC OR TIME - INITIAL BASE CHARGE - PROCEDURE LEVEL FOUR: Performed by: ORTHOPAEDIC SURGERY

## 2025-05-06 PROCEDURE — 7100000010 HC PHASE TWO TIME - EACH INCREMENTAL 1 MINUTE: Performed by: ORTHOPAEDIC SURGERY

## 2025-05-06 PROCEDURE — 2500000004 HC RX 250 GENERAL PHARMACY W/ HCPCS (ALT 636 FOR OP/ED): Performed by: PHYSICIAN ASSISTANT

## 2025-05-06 PROCEDURE — 3700000002 HC GENERAL ANESTHESIA TIME - EACH INCREMENTAL 1 MINUTE: Performed by: ORTHOPAEDIC SURGERY

## 2025-05-06 PROCEDURE — 3600000009 HC OR TIME - EACH INCREMENTAL 1 MINUTE - PROCEDURE LEVEL FOUR: Performed by: ORTHOPAEDIC SURGERY

## 2025-05-06 PROCEDURE — 2500000001 HC RX 250 WO HCPCS SELF ADMINISTERED DRUGS (ALT 637 FOR MEDICARE OP): Performed by: ANESTHESIOLOGY

## 2025-05-06 PROCEDURE — 2500000004 HC RX 250 GENERAL PHARMACY W/ HCPCS (ALT 636 FOR OP/ED): Mod: JZ | Performed by: ANESTHESIOLOGIST ASSISTANT

## 2025-05-06 PROCEDURE — 2500000004 HC RX 250 GENERAL PHARMACY W/ HCPCS (ALT 636 FOR OP/ED): Performed by: ORTHOPAEDIC SURGERY

## 2025-05-06 PROCEDURE — 3700000001 HC GENERAL ANESTHESIA TIME - INITIAL BASE CHARGE: Performed by: ORTHOPAEDIC SURGERY

## 2025-05-06 PROCEDURE — 7100000002 HC RECOVERY ROOM TIME - EACH INCREMENTAL 1 MINUTE: Performed by: ORTHOPAEDIC SURGERY

## 2025-05-06 PROCEDURE — A4550 SURGICAL TRAYS: HCPCS | Performed by: ORTHOPAEDIC SURGERY

## 2025-05-06 RX ORDER — ONDANSETRON 4 MG/1
4 TABLET, FILM COATED ORAL EVERY 8 HOURS PRN
Qty: 20 TABLET | Refills: 0 | Status: SHIPPED | OUTPATIENT
Start: 2025-05-06

## 2025-05-06 RX ORDER — LIDOCAINE HYDROCHLORIDE 10 MG/ML
0.1 INJECTION, SOLUTION EPIDURAL; INFILTRATION; INTRACAUDAL; PERINEURAL ONCE
Status: DISCONTINUED | OUTPATIENT
Start: 2025-05-06 | End: 2025-05-06 | Stop reason: HOSPADM

## 2025-05-06 RX ORDER — HYDRALAZINE HYDROCHLORIDE 20 MG/ML
5 INJECTION INTRAMUSCULAR; INTRAVENOUS EVERY 30 MIN PRN
Status: DISCONTINUED | OUTPATIENT
Start: 2025-05-06 | End: 2025-05-06 | Stop reason: HOSPADM

## 2025-05-06 RX ORDER — CEFAZOLIN SODIUM 2 G/100ML
2 INJECTION, SOLUTION INTRAVENOUS ONCE
Status: COMPLETED | OUTPATIENT
Start: 2025-05-06 | End: 2025-05-06

## 2025-05-06 RX ORDER — HYDROCODONE BITARTRATE AND ACETAMINOPHEN 5; 325 MG/1; MG/1
1 TABLET ORAL EVERY 6 HOURS PRN
Qty: 12 TABLET | Refills: 0 | Status: SHIPPED | OUTPATIENT
Start: 2025-05-06 | End: 2025-05-09

## 2025-05-06 RX ORDER — DOCUSATE SODIUM 100 MG/1
100 CAPSULE, LIQUID FILLED ORAL 2 TIMES DAILY
Qty: 30 CAPSULE | Refills: 0 | Status: SHIPPED | OUTPATIENT
Start: 2025-05-06 | End: 2025-05-21

## 2025-05-06 RX ORDER — ALBUTEROL SULFATE 0.83 MG/ML
2.5 SOLUTION RESPIRATORY (INHALATION) ONCE AS NEEDED
Status: DISCONTINUED | OUTPATIENT
Start: 2025-05-06 | End: 2025-05-06 | Stop reason: HOSPADM

## 2025-05-06 RX ORDER — MIDAZOLAM HYDROCHLORIDE 1 MG/ML
INJECTION, SOLUTION INTRAMUSCULAR; INTRAVENOUS AS NEEDED
Status: DISCONTINUED | OUTPATIENT
Start: 2025-05-06 | End: 2025-05-06

## 2025-05-06 RX ORDER — HYDROMORPHONE HYDROCHLORIDE 0.2 MG/ML
0.1 INJECTION INTRAMUSCULAR; INTRAVENOUS; SUBCUTANEOUS EVERY 5 MIN PRN
Status: DISCONTINUED | OUTPATIENT
Start: 2025-05-06 | End: 2025-05-06 | Stop reason: HOSPADM

## 2025-05-06 RX ORDER — HYDROMORPHONE HYDROCHLORIDE 0.2 MG/ML
0.2 INJECTION INTRAMUSCULAR; INTRAVENOUS; SUBCUTANEOUS EVERY 5 MIN PRN
Status: DISCONTINUED | OUTPATIENT
Start: 2025-05-06 | End: 2025-05-06 | Stop reason: HOSPADM

## 2025-05-06 RX ORDER — LIDOCAINE HYDROCHLORIDE 10 MG/ML
INJECTION, SOLUTION EPIDURAL; INFILTRATION; INTRACAUDAL; PERINEURAL AS NEEDED
Status: DISCONTINUED | OUTPATIENT
Start: 2025-05-06 | End: 2025-05-06

## 2025-05-06 RX ORDER — ONDANSETRON HYDROCHLORIDE 2 MG/ML
4 INJECTION, SOLUTION INTRAVENOUS ONCE AS NEEDED
Status: COMPLETED | OUTPATIENT
Start: 2025-05-06 | End: 2025-05-06

## 2025-05-06 RX ORDER — ONDANSETRON HYDROCHLORIDE 2 MG/ML
INJECTION, SOLUTION INTRAVENOUS AS NEEDED
Status: DISCONTINUED | OUTPATIENT
Start: 2025-05-06 | End: 2025-05-06

## 2025-05-06 RX ORDER — PROPOFOL 10 MG/ML
INJECTION, EMULSION INTRAVENOUS AS NEEDED
Status: DISCONTINUED | OUTPATIENT
Start: 2025-05-06 | End: 2025-05-06

## 2025-05-06 RX ORDER — OXYCODONE HYDROCHLORIDE 5 MG/1
5 TABLET ORAL ONCE
Refills: 0 | Status: COMPLETED | OUTPATIENT
Start: 2025-05-06 | End: 2025-05-06

## 2025-05-06 RX ORDER — SCOPOLAMINE 1 MG/3D
1 PATCH, EXTENDED RELEASE TRANSDERMAL
Status: DISCONTINUED | OUTPATIENT
Start: 2025-05-06 | End: 2025-05-06 | Stop reason: HOSPADM

## 2025-05-06 RX ORDER — SODIUM CHLORIDE, SODIUM LACTATE, POTASSIUM CHLORIDE, CALCIUM CHLORIDE 600; 310; 30; 20 MG/100ML; MG/100ML; MG/100ML; MG/100ML
100 INJECTION, SOLUTION INTRAVENOUS CONTINUOUS
Status: DISCONTINUED | OUTPATIENT
Start: 2025-05-06 | End: 2025-05-06 | Stop reason: HOSPADM

## 2025-05-06 RX ORDER — ASPIRIN 325 MG
325 TABLET, DELAYED RELEASE (ENTERIC COATED) ORAL DAILY
Qty: 15 TABLET | Refills: 0 | Status: SHIPPED | OUTPATIENT
Start: 2025-05-07

## 2025-05-06 RX ORDER — KETOROLAC TROMETHAMINE 30 MG/ML
INJECTION, SOLUTION INTRAMUSCULAR; INTRAVENOUS AS NEEDED
Status: DISCONTINUED | OUTPATIENT
Start: 2025-05-06 | End: 2025-05-06

## 2025-05-06 RX ORDER — SODIUM CHLORIDE, SODIUM LACTATE, POTASSIUM CHLORIDE, CALCIUM CHLORIDE 600; 310; 30; 20 MG/100ML; MG/100ML; MG/100ML; MG/100ML
INJECTION, SOLUTION INTRAVENOUS CONTINUOUS PRN
Status: DISCONTINUED | OUTPATIENT
Start: 2025-05-06 | End: 2025-05-06

## 2025-05-06 RX ORDER — FENTANYL CITRATE 50 UG/ML
INJECTION, SOLUTION INTRAMUSCULAR; INTRAVENOUS AS NEEDED
Status: DISCONTINUED | OUTPATIENT
Start: 2025-05-06 | End: 2025-05-06

## 2025-05-06 RX ORDER — BUPIVACAINE HCL/EPINEPHRINE 0.25-.0005
VIAL (ML) INJECTION AS NEEDED
Status: DISCONTINUED | OUTPATIENT
Start: 2025-05-06 | End: 2025-05-06 | Stop reason: HOSPADM

## 2025-05-06 RX ADMIN — OXYCODONE HYDROCHLORIDE 5 MG: 5 TABLET ORAL at 15:26

## 2025-05-06 RX ADMIN — HYDROMORPHONE HYDROCHLORIDE 0.2 MG: 0.2 INJECTION, SOLUTION INTRAMUSCULAR; INTRAVENOUS; SUBCUTANEOUS at 14:33

## 2025-05-06 RX ADMIN — ONDANSETRON 4 MG: 2 INJECTION, SOLUTION INTRAMUSCULAR; INTRAVENOUS at 13:48

## 2025-05-06 RX ADMIN — PROPOFOL 180 MG: 10 INJECTION, EMULSION INTRAVENOUS at 13:46

## 2025-05-06 RX ADMIN — KETOROLAC TROMETHAMINE 30 MG: 30 INJECTION, SOLUTION INTRAMUSCULAR; INTRAVENOUS at 14:16

## 2025-05-06 RX ADMIN — MIDAZOLAM 2 MG: 1 INJECTION INTRAMUSCULAR; INTRAVENOUS at 13:34

## 2025-05-06 RX ADMIN — FENTANYL CITRATE 50 MCG: 50 INJECTION, SOLUTION INTRAMUSCULAR; INTRAVENOUS at 13:48

## 2025-05-06 RX ADMIN — HYDROMORPHONE HYDROCHLORIDE 0.2 MG: 0.2 INJECTION, SOLUTION INTRAMUSCULAR; INTRAVENOUS; SUBCUTANEOUS at 14:38

## 2025-05-06 RX ADMIN — FENTANYL CITRATE 50 MCG: 50 INJECTION, SOLUTION INTRAMUSCULAR; INTRAVENOUS at 13:52

## 2025-05-06 RX ADMIN — DEXAMETHASONE SODIUM PHOSPHATE 8 MG: 4 INJECTION INTRA-ARTICULAR; INTRALESIONAL; INTRAMUSCULAR; INTRAVENOUS; SOFT TISSUE at 13:48

## 2025-05-06 RX ADMIN — SCOPOLAMINE 1 PATCH: 1.5 PATCH, EXTENDED RELEASE TRANSDERMAL at 12:51

## 2025-05-06 RX ADMIN — ONDANSETRON 4 MG: 2 INJECTION, SOLUTION INTRAMUSCULAR; INTRAVENOUS at 14:35

## 2025-05-06 RX ADMIN — LIDOCAINE HYDROCHLORIDE 5 ML: 10 INJECTION, SOLUTION EPIDURAL; INFILTRATION; INTRACAUDAL; PERINEURAL at 13:46

## 2025-05-06 RX ADMIN — PROPOFOL 20 MG: 10 INJECTION, EMULSION INTRAVENOUS at 13:48

## 2025-05-06 RX ADMIN — SODIUM CHLORIDE, POTASSIUM CHLORIDE, SODIUM LACTATE AND CALCIUM CHLORIDE: 600; 310; 30; 20 INJECTION, SOLUTION INTRAVENOUS at 13:34

## 2025-05-06 RX ADMIN — CEFAZOLIN SODIUM 2 G: 2 INJECTION, SOLUTION INTRAVENOUS at 13:48

## 2025-05-06 SDOH — HEALTH STABILITY: MENTAL HEALTH: CURRENT SMOKER: 0

## 2025-05-06 ASSESSMENT — PAIN SCALES - GENERAL
PAINLEVEL_OUTOF10: 4
PAINLEVEL_OUTOF10: 5 - MODERATE PAIN
PAINLEVEL_OUTOF10: 4
PAINLEVEL_OUTOF10: 5 - MODERATE PAIN
PAINLEVEL_OUTOF10: 0 - NO PAIN
PAIN_LEVEL: 4
PAINLEVEL_OUTOF10: 5 - MODERATE PAIN
PAINLEVEL_OUTOF10: 3

## 2025-05-06 ASSESSMENT — PAIN DESCRIPTION - LOCATION: LOCATION: KNEE

## 2025-05-06 ASSESSMENT — PAIN - FUNCTIONAL ASSESSMENT
PAIN_FUNCTIONAL_ASSESSMENT: 0-10

## 2025-05-06 ASSESSMENT — COLUMBIA-SUICIDE SEVERITY RATING SCALE - C-SSRS
6. HAVE YOU EVER DONE ANYTHING, STARTED TO DO ANYTHING, OR PREPARED TO DO ANYTHING TO END YOUR LIFE?: NO
1. IN THE PAST MONTH, HAVE YOU WISHED YOU WERE DEAD OR WISHED YOU COULD GO TO SLEEP AND NOT WAKE UP?: NO
2. HAVE YOU ACTUALLY HAD ANY THOUGHTS OF KILLING YOURSELF?: NO

## 2025-05-06 ASSESSMENT — PAIN DESCRIPTION - ORIENTATION: ORIENTATION: LEFT

## 2025-05-06 ASSESSMENT — PAIN DESCRIPTION - DESCRIPTORS: DESCRIPTORS: OTHER (COMMENT);THROBBING

## 2025-05-06 NOTE — OP NOTE
LEFT Knee Arthroscopy Operative Note     Date: 2025  OR Location: [facility] OR    Name: Beckie De León, : 1975, Age: 49 y.o., MRN: [id number], Sex: female    Diagnosis  Pre-op Diagnosis      * Complex tear of medial meniscus of left knee, subsequent encounter [T12.253D]     * Internal derangement of left knee [M23.92] Post-op Diagnosis  Right knee medial femoral condyle unstable chondral flap     Procedures  Left knee arthroscopic medial femoral condyle chondroplasty    Surgeons      * Cornel Turner - Primary    Resident/Fellow/Other Assistant:  Andrea AYALA    Procedure Summary  Anesthesia: Regional and LMA ASA: II  Anesthesia Staff: Anesthesiologist: Yasmani Ricks MD; Yumiko Henderson MD MPH  C-AA: PAMELA Lyod  Estimated Blood Loss: 5mL  Intra-op Medications:   Medication Name Total Dose   EPINEPHrine (Adrenalin) injection 1 mg   ceFAZolin in dextrose (iso-os) (Ancef) IVPB 2 g 2 g   fentaNYL PF (Sublimaze) injection 50 mcg 50 mcg   midazolam (Versed) injection 2 mg 2 mg          Anesthesia Record               Intraprocedure I/O Totals          Intake    LR 1200.00 mL    ceFAZolin in dextrose (iso-os) (Ancef) IVPB 2 g 100.00 mL    Total Intake 1300 mL          Specimen: No specimens collected     Staff:   Circulator: Ludy Del Rosario RN  Relief Circulator: DIAN Paniagua  Scrub Person: Yumiko Arriaga PA-C; Yee Benitez     Drains and/or Catheters: None    Tourniquet Times:     Total Tourniquet Time Documented:  Thigh (Left) - 17 minutes  Total: Thigh (Left) - 17 minutes      Implants: None    Findings: Left knee 1 cm x 1.5 cm grade 3 chondral lesion    Indications:  Beckie De León is a 49 y.o. active female who suffered a left knee symptomatic knee injury with mechanical symptoms resistant to conservative measures.  The patient continues to report knee pain and swelling. Physical examination and MRI confirmed findings. Knee arthroscopy is indicated. Risks and benefits were discussed  with the patient. After questions were answered consent was obtained to proceed. In the holding area of the left knee was signed as the appropriate operative site, and the patient was positively identified.     Procedure: In the operative suite the patient was placed supine on the table. An LMA was inserted by the anesthesiologist. A thigh tourniquet was placed. The lower extremity was prepped and draped in usual sterile fashion. Timeout was performed and 2 g Ancef was given intravenously prior to initiating the procedure.  The arthroscopic portal sites were identified and infiltrated with 0.25% Marcaine with epinephrine. The standard 2 portal diagnostic arthroscopy technique was utilized. The camera was inserted into the joint in an atraumatic fashion.  The suprapatellar pouch and gutters were unremarkable and free of debris.  The patellofemoral articular cartilage was inspected with mild fibrillation at the central trochlea without unstable flap.  The medial compartment was entered. The medial femoral condyle and tibial plateau articular cartilage was inspected revealing a 1 cm x 1.5 cm grade 3 unstable chondral flap at the posterior medial femoral condyle that engages at approximately 60 degrees of flexion.  Left knee arthroscopic medial femoral condyle chondroplasty was performed with a curved shaver to remove the unstable flaps of cartilage and carefully stabilize the edges.  The cartilage was probed and stable with the chondroplasty complete.    The medial meniscus was probed and intact.  The lateral compartment was entered. The lateral femoral condyle and tibial plateau articular cartilage was intact. The lateral meniscus was probed and intact.  The notch was entered. The anterior cruciate ligament and PCL were intact.  The knee was then lavaged and suctioned of debris. Instruments were removed and fluid expelled. Portals closed with interrupted 3-0 nylon sutures. A Xeroform and sterile cast was applied  followed by an Ace wrap. All sponge counts and needle counts were correct.   Postoperative DVT prophylaxis includes aspirin, active ankle foot pumps and early ambulation.  Andrea AYALA assisted with the case.  He provided necessary and critical assistance with the procedure.  This included patient positioning, holding the arthroscope during key steps and skin closure.      Complications:  None; patient tolerated the procedure well.    Disposition: PACU - hemodynamically stable.  Condition: stable     Attending Attestation: I performed the procedure.    Cornel Turner  Phone Number: [phone]

## 2025-05-06 NOTE — PERIOPERATIVE NURSING NOTE
Patient in Phase 2; dressed and up to chair with RN assist. Tolerating po fluids, moderate complaint of pain and no complaint of nausea.     Significant other at bedside; discussed discharge instructions with patient and Significant other. All questions at this time answered.     Discharge instructions provided using teachback method.  Patient's health-related risk factors discussed with patient.  Patient educated to look for worsening signs and symptoms and educated to seek medical attention if experiencing medical emergency.  Patient aware of needs to follow up with outpatient clinics as scheduled. Home going meds reviewed with patient.  Patient verbalized understanding of disposition and discharge instructions.  All questions answered to patient's satisfaction and within nursing scope of practice.    Patient clinically appropriate for discharge. Vitals stable/baseline.IV removed and patient transported to discharge area via wheelchair.

## 2025-05-06 NOTE — BRIEF OP NOTE
Date: 2025  OR Location: [facility] OR    Name: Beckie De León, : 1975, Age: 49 y.o., MRN: [id number], Sex: female    Diagnosis  Pre-op Diagnosis      * Complex tear of medial meniscus of left knee, subsequent encounter [Q07.955D]     * Internal derangement of left knee [M23.92] Post-op Diagnosis  Right knee medial femoral condyle unstable chondral flap     Procedures  Left knee arthroscopic medial femoral condyle chondroplasty    Surgeons      * Cornel Turner - Primary    Resident/Fellow/Other Assistant:  Andrea AYALA    Staff:   Scrub Person: Yumiko  Circulator: Ludy  Scrub Person: Yee Kline Circulator: Deya    Anesthesia Staff: Anesthesiologist: Yasmani Ricks MD; Yumiko Henderson MD MPH  C-AA: PAMELA Loyd    Procedure Summary  Anesthesia: General  ASA: II  Estimated Blood Loss: 5 mL  Intra-op Medications:   Administrations occurring from 1250 to 1425 on 25:   Medication Name Total Dose   BUPivacaine-EPINEPHrine (Marcaine w/EPI) 0.25 %-1:200,000 injection 5 mL   scopolamine (Transderm-Scop) patch 1 patch 1 patch   ceFAZolin (Ancef) 2 g in dextrose (iso)  mL 2 g   dexAMETHasone (Decadron) 4 mg/mL IV Syringe 2 mL 8 mg   fentaNYL (Sublimaze) injection 50 mcg/mL 100 mcg   ketorolac (Toradol) 30 mg 30 mg   LR infusion Cannot be calculated   lidocaine PF (Xylocaine-MPF) local injection 1 % 5 mL   midazolam (Versed) injection 1 mg/mL 2 mg   ondansetron (Zofran) 2 mg/mL injection 4 mg   propofol (Diprivan) injection 10 mg/mL 200 mg          Anesthesia Record               Intraprocedure I/O Totals          Intake    LR infusion 550.00 mL    ceFAZolin (Ancef) 2 g in dextrose (iso)  mL 100.00 mL    Total Intake 650 mL       Output    Est. Blood Loss 5 mL    Total Output 5 mL       Net    Net Volume 645 mL          Specimen: No specimens collected     Findings: Medial femoral condyle 1 cm x 1.5 cm grade 3 unstable chondral flap    Complications:  None; patient tolerated the  procedure well.     Disposition: PACU - hemodynamically stable.  Condition: stable  Specimens Collected: No specimens collected  Attending Attestation: I performed the procedure.    Cornel Turner  Phone Number: [phone]

## 2025-05-06 NOTE — ANESTHESIA PROCEDURE NOTES
Airway  Date/Time: 5/6/2025 1:47 PM  Reason: elective    Airway not difficult    Staffing  Performed: CAA   Authorized by: Yasmani Ricks MD    Performed by: PAMELA Loyd  Patient location during procedure: OR    Patient Condition  Indications for airway management: anesthesia  MILS maintained throughout  Planned trial extubation  Sedation level: deep     Final Airway Details   Preoxygenated: yes  Final airway type: supraglottic airway  Successful airway: classic  Size: 3  Number of attempts at approach: 1

## 2025-05-06 NOTE — ANESTHESIA POSTPROCEDURE EVALUATION
Patient: Beckie De León    Procedure Summary       Date: 05/06/25 Room / Location: [facility] OR 06 / Virtual [facility] OR    Anesthesia Start: 1334 Anesthesia Stop: 1430    Procedure: LEFT Knee Medial Femoral Chondroplasty (Left: Knee) Diagnosis:       Complex tear of medial meniscus of left knee, subsequent encounter      Internal derangement of left knee      (Complex tear of medial meniscus of left knee, subsequent encounter [S83.232D])      (Internal derangement of left knee [M23.92])    Surgeons: Cornel Turner MD Responsible Provider: Yumiko Henderson MD MPH    Anesthesia Type: general ASA Status: 2            Anesthesia Type: general    Vitals Value Taken Time   /84 05/06/25 14:55   Temp 36.2 °C (97.2 °F) 05/06/25 14:55   Pulse 77 05/06/25 14:55   Resp 12 05/06/25 14:55   SpO2 97 % 05/06/25 14:55       Anesthesia Post Evaluation    Patient location during evaluation: PACU  Patient participation: complete - patient participated  Level of consciousness: awake and alert  Pain score: 4  Pain management: adequate  Multimodal analgesia pain management approach  Airway patency: patent  Two or more strategies used to mitigate risk of obstructive sleep apnea  Cardiovascular status: acceptable  Respiratory status: acceptable  Hydration status: acceptable  Postoperative Nausea and Vomiting: none        There were no known notable events for this encounter.

## 2025-05-06 NOTE — ANESTHESIA PREPROCEDURE EVALUATION
Patient: Beckie De León    Procedure Information       Date/Time: 05/06/25 1250    Procedure: LEFT Knee Partial Medial Meniscectomy (LMA) (Left: Knee) - LMA    Location: [facility] / [facility]    Surgeons: Cornel Turner MD          Medical History[1]   Relevant Problems   Anesthesia   (+) PONV (postoperative nausea and vomiting)      Cardiac   (+) HTN (hypertension)   (+) Hyperlipidemia      GI   (+) Ulcerative colitis      Endocrine   (+) Diabetes mellitus, type 2 (Multi)     Surgical History[2]   Clinical information reviewed:   Tobacco  Allergies  Meds   Med Hx  Surg Hx   Fam Hx          NPO Detail:  No data recorded     Physical Exam    Airway  Mallampati: II  TM distance: >3 FB  Neck ROM: full     Cardiovascular    Dental    Pulmonary    Abdominal            Anesthesia Plan    History of general anesthesia?: yes  History of complications of general anesthesia?: no    ASA 2     general     The patient is not a current smoker.  Patient was not previously instructed to abstain from smoking on day of procedure.  Patient did not smoke on day of procedure.    intravenous induction   Postoperative pain plan includes opioids.  Anesthetic plan and risks discussed with patient.    Plan discussed with attending.           [1]   Past Medical History:  Diagnosis Date    Colitis     Hyperlipidemia     Hypertension     PONV (postoperative nausea and vomiting)     Type 2 diabetes mellitus     Ulcerative colitis     in remission as of 5/1/25   [2]   Past Surgical History:  Procedure Laterality Date    CARPAL TUNNEL RELEASE  09/23/2014    Neuroplasty Decompression Median Nerve At Carpal Tunnel

## 2025-05-06 NOTE — DISCHARGE INSTRUCTIONS
Cornel Turner M.D.  Department of Orthopedics  [address] [place] [address]  Office: [phone]    POST OPERATIVE INSTRUCTIONS FOR KNEE ARTHROSCOPY      General Anesthesia or Sedation  You have been given medications that affect your balance and coordination for 24 hours.  Go directly home from the hospital and rest for the remainder of the day.  Have a responsible adult stay with you today and overnight.  Do not drive or operate equipment, conduct business or sign any legal documents for the next 24 hours or while taking pain medication.  Do not drink alcohol, take tranquilizers or sleeping pills for the next 24 hours or while taking pain medication.  Deep breathe and cough two times at least every 4 hours today and tomorrow while you are awake. This will help keep fevers away.   Use your CPAP or BiPAP machine whenever sleeping during the day or night.    Diet  Start a light meal and advance to your regular diet as tolerated    Dressing/Wound Care  Keep your dressing clean and dry.  You may remove your dressing in 2 days. Please remove the yellow strips as well.   You may see some spotting or drainage on your dressing, this is normal.  The purpose of the dressing is to apply pressure to the incision and to soak up any discharge or drainage from the incision.  Inspect the incision area for redness, swelling or drainage.  Leave Steri-Strips in place if present.  Apply band aids over incision.    Showering/Bathing  After the dressing has been removed, you may shower. Please cover the incisions with water proof band aids or a plastic wrap. Incisions should stay dry until sutures have been removed.  Allow soap and water to gently run over the operative area and pat dry when covered until incisions are fully healed.                 Activity and Exercise  Begin  Sports Medicine leg exercises (see instruction sheet) on post op day 1.  Your weight bearing status until your follow up  appointment is weight bearing as tolerated. Use crutches for 2 - 3 days and progress your weight bearing as you feel comfortable. Do not discontinue crutches until you are able to walk comfortably without a limp.     **Physical Therapy can start 2 weeks post op. Please schedule. A PT order will be provided at your first post op appointment.    Ice/Polar Care  Apply an ice pack every 2 hours for 20 - 30 minutes while awake for the first 2 - 3 days.  Then 3 - 5 times a day for 20 minutes until seen by your surgeon.  Never place an ice pack directly on skin.  Always have a barrier such as a towel between the ice pack and your skin.  Your Polar Care unit is to be work continuously for the first 2 days postoperatively, then 3 - 5 times daily for 30 minutes until seen by your surgeon.  Refill with ice and water as needed.    Elevation  Elevating your operative extremity will lessen swelling and discomfort.    Support Hose  Wear the support hose sent home with you at all times if provided with them.  Please take the additional hose with you to the Physical Therapist or Physician office to put on your surgical leg after the dressing is removed.  You may take the hose off to hand wash and air dry, do not place them in the washer or dryer.  You will need to wear the support hose until cleared by your physician.  Wearing compression stocking, using blood thinning medication (typically aspirin) and performing ankle pumps help to prevent blood clots!      Medications  Pain medications should be taken with food.  You may experience dizziness or drowsiness while taking your prescribed pain medication.   DO NOT DRIVE!  DO NOT DRINK ALCOHOL!  Pain medication can be constipating, drink plenty of fluids and increase your fiber intake to avoid this problem.  If you develop constipation, Colace is an over the counter stool softener you may use.  New Take Home Medications - Take as directed on bottle.    Resume your prescription  medications as directed by your physician.    Do not take other medications containing Tylenol while on your pain medications.    When To Notify Your Physician  Persistent temperature greater than 101°F.  Increase or severe pain that does not respond to elevation, ice or pain medication.  Unexplained redness, swelling, numbness or tingling that does not improve with elevation or ice.  Increased amount or change in color of drainage.  Persistent nausea and vomiting.  Fingers and toes should remain pink and warm.  Notify your doctor if they become cool, grey or numb.  If you cannot reach your surgeon, seek care at an Urgent Care Center or Emergency Room.      For questions or concerns regarding your care please contact your surgeon      Dr. Cornel Turner    [phone]   Andrea Romero PA-C    [phone]   After hours and weekends   [phone]    Post Operative Appointment:  Please call [facility] at [phone] to schedule your first appointment with Andrea Romero PA-C in 10-12 days if not previously done.    PLEASE NOTE:  Additional information and instruction will be provided by your physician regarding your surgical procedure and continued care at your initial post operative office appointment              Heel Slide:   If brace is on wait on this. Sitting, pull foot towards body.    Assist stretch with hands. Hold for 5 seconds, then bend a   little bit farther and hold for another 5 seconds then relax.  Repeat 15 times, 6 times per day.           Heel Prop:  Whenever sitting, place heel on a footrest. Heel must  be high enough so your calf and thigh are off the ground.      Towel/Cord Stretch-Toe pulls:       Sitting, wrap towel or cord around foot.  Pull   With one hand to raise foot.  Stabilize your leg  by placing your other hand on your thigh. Pull  on strap with thigh muscle relaxed for 5 seconds.  Then contract thigh muscle while releasing cord  and hold heel up for 5 seconds. Repeat  sequence 10 times, 6  times per day.      Quad Sets:   Tighten thigh muscle while pushing your knee   down into the towel.  Hold for 5 seconds then rest   for 5 seconds and repeat.  Perform 10 times, 6   times per day.    Straight Leg Raise:          Sit with back supported, or lay down. Tighten front thigh muscle, pull toe   back toward you, then lift leg 8-10   inches off surface. Pause at the top and   lower   slowly to start position. Repeat 15   times, 6 times per day. Sometimes this is easier a week after surgery.       ** Extension Habit ** When rising to stand, shift weight to involved leg and tighten thigh muscle to lock out the knee.  Hold for 10 seconds.    ** Don't forget ankle pumps throughout the day as well!!

## 2025-05-07 ASSESSMENT — PAIN SCALES - GENERAL: PAINLEVEL_OUTOF10: 4

## 2025-05-21 ENCOUNTER — OFFICE VISIT (OUTPATIENT)
Dept: ORTHOPEDIC SURGERY | Facility: HOSPITAL | Age: 50
End: 2025-05-21
Payer: COMMERCIAL

## 2025-05-21 DIAGNOSIS — M23.8X2 CHONDRAL DEFECT OF CONDYLE OF LEFT FEMUR: Primary | ICD-10-CM

## 2025-05-21 PROCEDURE — 1036F TOBACCO NON-USER: CPT | Performed by: PHYSICIAN ASSISTANT

## 2025-05-21 PROCEDURE — 4010F ACE/ARB THERAPY RXD/TAKEN: CPT | Performed by: PHYSICIAN ASSISTANT

## 2025-05-21 PROCEDURE — 3051F HG A1C>EQUAL 7.0%<8.0%: CPT | Performed by: PHYSICIAN ASSISTANT

## 2025-05-21 PROCEDURE — 99211 OFF/OP EST MAY X REQ PHY/QHP: CPT | Performed by: PHYSICIAN ASSISTANT

## 2025-05-21 ASSESSMENT — PAIN SCALES - GENERAL: PAINLEVEL_OUTOF10: 3

## 2025-05-21 ASSESSMENT — PAIN - FUNCTIONAL ASSESSMENT: PAIN_FUNCTIONAL_ASSESSMENT: 0-10

## 2025-05-21 NOTE — PROGRESS NOTES
Procedure: Left knee arthroscopic medial femoral chondroplasty  Date of procedure: 5/6/2025    HPI:   Beckie returns for follow up status post knee arthroscopy. Pain is minimal. Narcotics are no longer required. The patient is able to ambulate without an assistive device. Home exercises have been performed. Incisions are benign. The patient does not report any adverse events.  She notes more pain medially but not quite as deep in her knee.    ROS  Constitutional: No fever, no chills, not feeling tired, no recent weight gain and no recent weight loss  ENT: No nosebleeds  Cardiovascular: No chest pain  Respiratory: No shortness of breath and no cough  Gastrointestinal: No abdominal pain, no nausea, no diarrhea, and no vomiting  Musculoskeletal: No arthralgias  Integumentary: No rashes and no skin lesions  Neurological: No headache  Psychiatric: No sleep disturbances no depression  Endocrine: No muscle weakness and no muscle cramps  Hematologic/lymphatic: No swelling glands and no tendency for easy bruising    Medical History[1]     Surgical History[2]     Current Medications[3]     RX Allergies[4]     Social Connections: Not on file         Physical exam:  49-year-old female well appearing in no acute distress  Left knee incisions are healed with sutures in place. No erythema or drainage. Minimal swelling. Sutures are removed and steri strips applied without complication.  Quadriceps strength is improving with slight atrophy.  Able to perform active straight leg raise.  Range of motion 0-120°.  Bilateral lower extremity compartments soft and supple.  Intact bilateral lower extremity neurovascular exam.    Diagnosis:  Left knee medial femoral condyle chondral defect    Plan:  1. The surgical details were reviewed with the patient. Questions were answered regarding the procedure.  2. Physical therapy is prescribed for postoperative knee exercises, edema control, and lower extremity strength. Activity restrictions  "outlined.  Keep activity level to day-to-day activities for the next 2 to 3 weeks.    3. Continue to ice the knee. Utilize NSAIDs as needed for pain control and swelling. Monitor for GI distress. It is ok to shower. Avoid submerging the knee under water for another 2 weeks.  4. Follow up 4 weeks. The patient agrees with this plan.    This office note was dictated using Dragon voice to text software and was not proofread for spelling or grammatical errors           [1]   Past Medical History:  Diagnosis Date    Colitis     Hyperlipidemia     Hypertension     PONV (postoperative nausea and vomiting)     Type 2 diabetes mellitus     Ulcerative colitis     in remission as of 5/1/25   [2]   Past Surgical History:  Procedure Laterality Date    CARPAL TUNNEL RELEASE  09/23/2014    Neuroplasty Decompression Median Nerve At Carpal Tunnel    OTHER SURGICAL HISTORY N/A 2010    pt had a cyst removed anterior neck   [3]   Current Outpatient Medications:     aspirin 325 mg EC tablet, Take 1 tablet (325 mg) by mouth once daily. Do not fill before May 7, 2025., Disp: 15 tablet, Rfl: 0    atorvastatin (Lipitor) 40 mg tablet, , Disp: , Rfl:     docusate sodium (Colace) 100 mg capsule, Take 1 capsule (100 mg) by mouth 2 times a day for 15 days., Disp: 30 capsule, Rfl: 0    empagliflozin (Jardiance) 25 mg, Take 1 tablet (25 mg) by mouth once daily., Disp: , Rfl:     losartan (Cozaar) 50 mg tablet, , Disp: , Rfl:     norethindrone-e.estradioL-iron (Blisovi 24 Fe) 1 mg-20 mcg (24)/75 mg (4) tablet, Take 1 tablet by mouth once daily. as directed, Disp: 84 tablet, Rfl: 3    ondansetron (Zofran) 4 mg tablet, Take 1 tablet (4 mg) by mouth every 8 hours if needed for nausea or vomiting., Disp: 20 tablet, Rfl: 0    Trulicity 3 mg/0.5 mL pen injector, , Disp: , Rfl:   [4]   Allergies  Allergen Reactions    Glipizide Other     Hypoglycemia    Hydrocodone-Acetaminophen Unknown     Per patient, intolerant to \"any narcotic\"     "

## 2025-06-18 ENCOUNTER — APPOINTMENT (OUTPATIENT)
Dept: ORTHOPEDIC SURGERY | Facility: HOSPITAL | Age: 50
End: 2025-06-18
Payer: COMMERCIAL

## 2025-07-07 DIAGNOSIS — Z30.40 ENCOUNTER FOR SURVEILLANCE OF CONTRACEPTIVES, UNSPECIFIED: ICD-10-CM

## 2025-07-07 RX ORDER — NORETHINDRONE ACETATE AND ETHINYL ESTRADIOL 1MG-20(24)
1 KIT ORAL DAILY
Qty: 84 TABLET | Refills: 3 | Status: SHIPPED | OUTPATIENT
Start: 2025-07-07

## 2025-08-08 ENCOUNTER — APPOINTMENT (OUTPATIENT)
Dept: OBSTETRICS AND GYNECOLOGY | Facility: CLINIC | Age: 50
End: 2025-08-08
Payer: COMMERCIAL

## 2025-08-08 VITALS
SYSTOLIC BLOOD PRESSURE: 112 MMHG | BODY MASS INDEX: 24.53 KG/M2 | WEIGHT: 143 LBS | HEART RATE: 96 BPM | DIASTOLIC BLOOD PRESSURE: 79 MMHG

## 2025-08-08 DIAGNOSIS — Z01.419 ENCOUNTER FOR GYNECOLOGICAL EXAMINATION WITHOUT ABNORMAL FINDING: Primary | ICD-10-CM

## 2025-08-08 DIAGNOSIS — Z30.40 ENCOUNTER FOR SURVEILLANCE OF CONTRACEPTIVES, UNSPECIFIED: ICD-10-CM

## 2025-08-08 PROCEDURE — 4010F ACE/ARB THERAPY RXD/TAKEN: CPT | Performed by: OBSTETRICS & GYNECOLOGY

## 2025-08-08 PROCEDURE — 3078F DIAST BP <80 MM HG: CPT | Performed by: OBSTETRICS & GYNECOLOGY

## 2025-08-08 PROCEDURE — 99396 PREV VISIT EST AGE 40-64: CPT | Performed by: OBSTETRICS & GYNECOLOGY

## 2025-08-08 PROCEDURE — 3074F SYST BP LT 130 MM HG: CPT | Performed by: OBSTETRICS & GYNECOLOGY

## 2025-08-08 RX ORDER — NORETHINDRONE ACETATE AND ETHINYL ESTRADIOL 1MG-20(24)
1 KIT ORAL DAILY
Qty: 84 TABLET | Refills: 3 | Status: SHIPPED | OUTPATIENT
Start: 2025-08-08

## 2025-08-08 ASSESSMENT — ENCOUNTER SYMPTOMS
OCCASIONAL FEELINGS OF UNSTEADINESS: 0
DEPRESSION: 0
LOSS OF SENSATION IN FEET: 0

## 2025-08-08 ASSESSMENT — COLUMBIA-SUICIDE SEVERITY RATING SCALE - C-SSRS
2. HAVE YOU ACTUALLY HAD ANY THOUGHTS OF KILLING YOURSELF?: NO
1. IN THE PAST MONTH, HAVE YOU WISHED YOU WERE DEAD OR WISHED YOU COULD GO TO SLEEP AND NOT WAKE UP?: NO
6. HAVE YOU EVER DONE ANYTHING, STARTED TO DO ANYTHING, OR PREPARED TO DO ANYTHING TO END YOUR LIFE?: NO

## 2025-08-08 ASSESSMENT — PATIENT HEALTH QUESTIONNAIRE - PHQ9
1. LITTLE INTEREST OR PLEASURE IN DOING THINGS: NOT AT ALL
SUM OF ALL RESPONSES TO PHQ9 QUESTIONS 1 AND 2: 0
2. FEELING DOWN, DEPRESSED OR HOPELESS: NOT AT ALL

## 2025-08-08 NOTE — PROGRESS NOTES
Subjective   Beckie De León is a 49 y.o. female here for a routine exam.  She has pill amenorrhea on Blisovi .  There is no spotting,  no discharge, no dysuria or change in bowel habits.  She is current on her colonoscopy which she has done every 5 years.  She has a history of ulcerative colitis.    We did discuss her  recently had an MI and is recovering well.     Personal health questionnaire reviewed: yes.     Gynecologic History  No LMP recorded (lmp unknown).  Contraception: OCP (estrogen/progesterone)  Last Pap: 24. Results were: normal(ASCUS, HPV negative).  Last mammogram: 24. Results were: normal    Obstetric History  OB History    Para Term  AB Living   2 2 0 2 0 2   SAB IAB Ectopic Multiple Live Births   0 0 0 0 2      # Outcome Date GA Lbr [place]/2nd Weight Sex Type Anes PTL Lv   2             1                 Objective   Constitutional: Alert and in no acute distress. Well developed, well nourished.   Head and Face: Head and face: Normal.    Eyes: Normal external exam - nonicteric sclera, extraocular movements intact (EOMI) and no ptosis.   Neck: No neck asymmetry. Supple. Thyroid not enlarged and there were no palpable thyroid nodules.    Pulmonary: No respiratory distress.   Chest: Breasts: Normal appearance, no nipple discharge and no skin changes. Palpation of breasts and axillae: No palpable mass and no axillary lymphadenopathy.   Abdomen: Soft nontender; no abdominal mass palpated. No organomegaly. No hernias.   Genitourinary: External genitalia: Normal. No inguinal lymphadenopathy. Bartholin's Urethral and Skenes Glands: Normal. Urethra: Normal.  Bladder: Normal on palpation. Vagina: Normal. Cervix: Normal.  Uterus: Normal.  Right Adnexa/parametria: Normal.  Left Adnexa/parametria: Normal.  Inspection of Perianal Area: Normal.   Musculoskeletal: No joint swelling seen, normal movements of all extremities.   Skin: Normal skin color and pigmentation,  normal skin turgor, and no rash.   Neurologic: Non-focal. Grossly intact.   Psychiatric: Alert and oriented x 3. Affect normal to patient baseline. Mood: Appropriate.  Physical Exam     Assessment/Plan   Healthy female exam.  This is a 49-year-old female with a normal exam.  No Pap smear was sent, she is high risk HPV negative in 2024.    Her routine mammogram was ordered with tomosynthesis.    A refill for her birth control was ordered to the pharmacy.  We discussed checking for menopause around age 51.    I will see her in 1 year.  Education reviewed: self breast exams.  Contraception: OCP (estrogen/progesterone).  Mammogram ordered.

## 2026-08-14 ENCOUNTER — APPOINTMENT (OUTPATIENT)
Facility: CLINIC | Age: 51
End: 2026-08-14
Payer: COMMERCIAL

## (undated) DEVICE — Device

## (undated) DEVICE — NEEDLE, HYPODERMIC, PROEDGE, 22G X 1.5, BLACK

## (undated) DEVICE — PADDING, UNDERCAST, WEBRIL, 6 IN X 4 YD, REG, NS

## (undated) DEVICE — DRAPE, SHEET, 17 X 23 IN

## (undated) DEVICE — SOLUTION, IRRIGATION, X RX SODIUM CHL 0.9%, 1000ML BTL

## (undated) DEVICE — BANDAGE, ESMARK, 6 IN X 12 FT

## (undated) DEVICE — BANDAGE, ELASTIC, MATRIX, SELF-CLOSURE, 6 IN X 5 YD, LF

## (undated) DEVICE — SYRINGE, 20 CC, LUER LOCK

## (undated) DEVICE — GLOVE, SURGICAL, PROTEXIS PI BLUE W/NEUTHERA, 8.5, PF, LF

## (undated) DEVICE — SUTURE, ETHILON, 3-0, 18 IN, PS1, BLACK

## (undated) DEVICE — SHAVER, SABRETOOTH, CURVED, 4.0MM X 13CM

## (undated) DEVICE — SOLUTION, IRRIGATION, USP, SODIUM CHLORIDE 0.9%, 3000 ML

## (undated) DEVICE — TUBING, PATIENT 8FT STERILE

## (undated) DEVICE — PADDING, UNDERCAST, WEBRIL, 4 IN X 4 YD, REG, NS

## (undated) DEVICE — TUBING, DUAL WAVE, OUTFLOW